# Patient Record
Sex: FEMALE | Race: WHITE | NOT HISPANIC OR LATINO | Employment: FULL TIME | URBAN - METROPOLITAN AREA
[De-identification: names, ages, dates, MRNs, and addresses within clinical notes are randomized per-mention and may not be internally consistent; named-entity substitution may affect disease eponyms.]

---

## 2017-01-28 ENCOUNTER — OFFICE VISIT (OUTPATIENT)
Dept: URGENT CARE | Facility: MEDICAL CENTER | Age: 32
End: 2017-01-28
Payer: COMMERCIAL

## 2017-01-28 DIAGNOSIS — J02.9 ACUTE PHARYNGITIS: ICD-10-CM

## 2017-01-28 PROCEDURE — 87070 CULTURE OTHR SPECIMN AEROBIC: CPT

## 2017-01-28 PROCEDURE — 99202 OFFICE O/P NEW SF 15 MIN: CPT

## 2017-01-28 PROCEDURE — 87430 STREP A AG IA: CPT

## 2017-01-30 ENCOUNTER — HOSPITAL ENCOUNTER (EMERGENCY)
Facility: HOSPITAL | Age: 32
Discharge: HOME | End: 2017-01-30
Attending: EMERGENCY MEDICINE | Admitting: EMERGENCY MEDICINE
Payer: COMMERCIAL

## 2017-01-30 VITALS
OXYGEN SATURATION: 96 % | HEIGHT: 64 IN | HEART RATE: 76 BPM | WEIGHT: 216 LBS | SYSTOLIC BLOOD PRESSURE: 105 MMHG | RESPIRATION RATE: 18 BRPM | BODY MASS INDEX: 36.88 KG/M2 | TEMPERATURE: 97.4 F | DIASTOLIC BLOOD PRESSURE: 63 MMHG

## 2017-01-30 DIAGNOSIS — R51.9 HEADACHE: Primary | ICD-10-CM

## 2017-01-30 LAB
BACTERIA UR QL AUTO: ABNORMAL /HPF
BILIRUB UR QL STRIP: NEGATIVE
CLARITY UR: ABNORMAL
COLOR UR: YELLOW
GLUCOSE UR STRIP-MCNC: NEGATIVE MG/DL
HGB UR QL STRIP.AUTO: ABNORMAL
KETONES UR STRIP-MCNC: NEGATIVE MG/DL
LEUKOCYTE ESTERASE UR QL STRIP: ABNORMAL
NITRITE UR QL STRIP: NEGATIVE
NON-SQ EPI CELLS URNS QL MICRO: ABNORMAL /HPF
PH UR STRIP.AUTO: 6.5 [PH] (ref 4.5–8)
PROT UR STRIP-MCNC: NEGATIVE MG/DL
RBC #/AREA URNS AUTO: ABNORMAL /HPF
SP GR UR STRIP.AUTO: 1.01 (ref 1–1.03)
UROBILINOGEN UR QL STRIP.AUTO: 0.2 E.U./DL
WBC #/AREA URNS AUTO: ABNORMAL /HPF

## 2017-01-30 PROCEDURE — 87086 URINE CULTURE/COLONY COUNT: CPT

## 2017-01-30 PROCEDURE — 96375 TX/PRO/DX INJ NEW DRUG ADDON: CPT

## 2017-01-30 PROCEDURE — 81001 URINALYSIS AUTO W/SCOPE: CPT

## 2017-01-30 PROCEDURE — 96365 THER/PROPH/DIAG IV INF INIT: CPT

## 2017-01-30 PROCEDURE — 96361 HYDRATE IV INFUSION ADD-ON: CPT

## 2017-01-30 PROCEDURE — 99283 EMERGENCY DEPT VISIT LOW MDM: CPT

## 2017-01-30 RX ORDER — MORPHINE SULFATE 10 MG/ML
6 INJECTION, SOLUTION INTRAMUSCULAR; INTRAVENOUS ONCE
Status: COMPLETED | OUTPATIENT
Start: 2017-01-30 | End: 2017-01-30

## 2017-01-30 RX ORDER — KETOROLAC TROMETHAMINE 30 MG/ML
15 INJECTION, SOLUTION INTRAMUSCULAR; INTRAVENOUS ONCE
Status: COMPLETED | OUTPATIENT
Start: 2017-01-30 | End: 2017-01-30

## 2017-01-30 RX ORDER — METOCLOPRAMIDE HYDROCHLORIDE 5 MG/ML
10 INJECTION INTRAMUSCULAR; INTRAVENOUS ONCE
Status: COMPLETED | OUTPATIENT
Start: 2017-01-30 | End: 2017-01-30

## 2017-01-30 RX ORDER — DIPHENHYDRAMINE HYDROCHLORIDE 50 MG/ML
25 INJECTION INTRAMUSCULAR; INTRAVENOUS ONCE
Status: COMPLETED | OUTPATIENT
Start: 2017-01-30 | End: 2017-01-30

## 2017-01-30 RX ORDER — MAGNESIUM SULFATE HEPTAHYDRATE 40 MG/ML
2 INJECTION, SOLUTION INTRAVENOUS ONCE
Status: COMPLETED | OUTPATIENT
Start: 2017-01-30 | End: 2017-01-30

## 2017-01-30 RX ORDER — OLANZAPINE 10 MG/1
10 TABLET, ORALLY DISINTEGRATING ORAL ONCE
Status: COMPLETED | OUTPATIENT
Start: 2017-01-30 | End: 2017-01-30

## 2017-01-30 RX ORDER — DIVALPROEX SODIUM 500 MG/1
500 TABLET, EXTENDED RELEASE ORAL DAILY
Status: DISCONTINUED | OUTPATIENT
Start: 2017-01-30 | End: 2017-01-30 | Stop reason: HOSPADM

## 2017-01-30 RX ORDER — OLANZAPINE 10 MG/1
10 TABLET, ORALLY DISINTEGRATING ORAL
Status: DISCONTINUED | OUTPATIENT
Start: 2017-01-30 | End: 2017-01-30

## 2017-01-30 RX ADMIN — OLANZAPINE 10 MG: 10 TABLET, ORALLY DISINTEGRATING ORAL at 11:52

## 2017-01-30 RX ADMIN — DIVALPROEX SODIUM 500 MG: 500 TABLET, EXTENDED RELEASE ORAL at 10:01

## 2017-01-30 RX ADMIN — DIPHENHYDRAMINE HYDROCHLORIDE 25 MG: 50 INJECTION, SOLUTION INTRAMUSCULAR; INTRAVENOUS at 08:41

## 2017-01-30 RX ADMIN — DEXAMETHASONE SODIUM PHOSPHATE 10 MG: 10 INJECTION, SOLUTION INTRAMUSCULAR; INTRAVENOUS at 11:53

## 2017-01-30 RX ADMIN — MAGNESIUM SULFATE HEPTAHYDRATE 2 G: 40 INJECTION, SOLUTION INTRAVENOUS at 10:00

## 2017-01-30 RX ADMIN — SODIUM CHLORIDE 1000 ML: 0.9 INJECTION, SOLUTION INTRAVENOUS at 08:39

## 2017-01-30 RX ADMIN — METOCLOPRAMIDE 10 MG: 5 INJECTION, SOLUTION INTRAMUSCULAR; INTRAVENOUS at 08:39

## 2017-01-30 RX ADMIN — MORPHINE SULFATE 6 MG: 10 INJECTION, SOLUTION INTRAMUSCULAR; INTRAVENOUS at 13:27

## 2017-01-30 RX ADMIN — KETOROLAC TROMETHAMINE 15 MG: 30 INJECTION, SOLUTION INTRAMUSCULAR at 08:41

## 2017-01-31 LAB
BACTERIA THROAT CULT: NORMAL
BACTERIA UR CULT: NORMAL

## 2017-03-01 ENCOUNTER — ALLSCRIPTS OFFICE VISIT (OUTPATIENT)
Dept: OTHER | Facility: OTHER | Age: 32
End: 2017-03-01

## 2017-03-01 ENCOUNTER — TRANSCRIBE ORDERS (OUTPATIENT)
Dept: ADMINISTRATIVE | Facility: HOSPITAL | Age: 32
End: 2017-03-01

## 2017-03-01 DIAGNOSIS — G43.829 MENSTRUAL MIGRAINE WITHOUT STATUS MIGRAINOSUS, NOT INTRACTABLE: ICD-10-CM

## 2017-03-01 DIAGNOSIS — G44.85 STABBING HEADACHE: ICD-10-CM

## 2017-03-01 DIAGNOSIS — R52 PAIN: ICD-10-CM

## 2017-03-01 DIAGNOSIS — G50.1 ATYPICAL FACE PAIN: Primary | ICD-10-CM

## 2017-03-01 DIAGNOSIS — G44.85 PRIMARY STABBING HEADACHE: ICD-10-CM

## 2017-03-01 DIAGNOSIS — G50.1 ATYPICAL FACIAL PAIN: ICD-10-CM

## 2017-03-01 DIAGNOSIS — E55.9 VITAMIN D DEFICIENCY: ICD-10-CM

## 2017-03-11 ENCOUNTER — LAB CONVERSION - ENCOUNTER (OUTPATIENT)
Dept: OTHER | Facility: OTHER | Age: 32
End: 2017-03-11

## 2017-03-11 LAB — 25(OH)D3 SERPL-MCNC: 16 NG/ML (ref 30–100)

## 2017-03-13 ENCOUNTER — LAB CONVERSION - ENCOUNTER (OUTPATIENT)
Dept: OTHER | Facility: OTHER | Age: 32
End: 2017-03-13

## 2017-03-13 LAB
25(OH)D3 SERPL-MCNC: 16 NG/ML (ref 30–100)
A/G RATIO (HISTORICAL): 1.3 (CALC) (ref 1–2.5)
ALBUMIN SERPL BCP-MCNC: 3.9 G/DL (ref 3.6–5.1)
ALP SERPL-CCNC: 59 U/L (ref 33–115)
ALT SERPL W P-5'-P-CCNC: 8 U/L (ref 6–29)
AST SERPL W P-5'-P-CCNC: 10 U/L (ref 10–30)
BILIRUB SERPL-MCNC: 0.2 MG/DL (ref 0.2–1.2)
BUN SERPL-MCNC: 10 MG/DL (ref 7–25)
BUN/CREA RATIO (HISTORICAL): NORMAL (CALC) (ref 6–22)
CALCIUM SERPL-MCNC: 8.8 MG/DL (ref 8.6–10.2)
CHLORIDE SERPL-SCNC: 106 MMOL/L (ref 98–110)
CO2 SERPL-SCNC: 27 MMOL/L (ref 20–31)
CREAT SERPL-MCNC: 0.59 MG/DL (ref 0.5–1.1)
EGFR AFRICAN AMERICAN (HISTORICAL): 143 ML/MIN/1.73M2
EGFR-AMERICAN CALC (HISTORICAL): 123 ML/MIN/1.73M2
GAMMA GLOBULIN (HISTORICAL): 3.1 G/DL (CALC) (ref 1.9–3.7)
GLUCOSE (HISTORICAL): 95 MG/DL (ref 65–99)
HOMOCYST(E)INE, P/S (HISTORICAL): 5.3 UMOL/L
POTASSIUM SERPL-SCNC: 3.7 MMOL/L (ref 3.5–5.3)
SODIUM SERPL-SCNC: 140 MMOL/L (ref 135–146)
TOTAL PROTEIN (HISTORICAL): 7 G/DL (ref 6.1–8.1)
VIT B12 SERPL-MCNC: 251 PG/ML (ref 200–1100)

## 2017-03-16 ENCOUNTER — HOSPITAL ENCOUNTER (OUTPATIENT)
Dept: MRI IMAGING | Facility: HOSPITAL | Age: 32
Discharge: HOME | End: 2017-03-16
Attending: PSYCHIATRY & NEUROLOGY
Payer: COMMERCIAL

## 2017-03-16 DIAGNOSIS — G44.85 STABBING HEADACHE: ICD-10-CM

## 2017-03-16 PROCEDURE — A9585 GADOBUTROL INJECTION: HCPCS | Performed by: PSYCHIATRY & NEUROLOGY

## 2017-03-16 PROCEDURE — 70553 MRI BRAIN STEM W/O & W/DYE: CPT

## 2017-03-16 RX ADMIN — GADOBUTROL 10 ML: 604.72 INJECTION INTRAVENOUS at 18:29

## 2017-03-31 ENCOUNTER — ALLSCRIPTS OFFICE VISIT (OUTPATIENT)
Dept: OTHER | Facility: OTHER | Age: 32
End: 2017-03-31

## 2017-04-28 ENCOUNTER — ALLSCRIPTS OFFICE VISIT (OUTPATIENT)
Dept: OTHER | Facility: OTHER | Age: 32
End: 2017-04-28

## 2017-04-28 DIAGNOSIS — E66.9 OBESITY: ICD-10-CM

## 2017-04-28 DIAGNOSIS — R59.1 GENERALIZED ENLARGED LYMPH NODES: ICD-10-CM

## 2017-05-09 ENCOUNTER — LAB CONVERSION - ENCOUNTER (OUTPATIENT)
Dept: OTHER | Facility: OTHER | Age: 32
End: 2017-05-09

## 2017-05-09 LAB
A/G RATIO (HISTORICAL): 1.2 (CALC) (ref 1–2.5)
ALBUMIN SERPL BCP-MCNC: 3.7 G/DL (ref 3.6–5.1)
ALP SERPL-CCNC: 62 U/L (ref 33–115)
ALT SERPL W P-5'-P-CCNC: 10 U/L (ref 6–29)
ANTI-NUCLEAR ANTIBODY (ANA) (HISTORICAL): NEGATIVE
AST SERPL W P-5'-P-CCNC: 13 U/L (ref 10–30)
BASOPHILS # BLD AUTO: 0.3 %
BASOPHILS # BLD AUTO: 28 CELLS/UL (ref 0–200)
BILIRUB SERPL-MCNC: 0.5 MG/DL (ref 0.2–1.2)
BUN SERPL-MCNC: 9 MG/DL (ref 7–25)
BUN/CREA RATIO (HISTORICAL): NORMAL (CALC) (ref 6–22)
CALCIUM SERPL-MCNC: 8.8 MG/DL (ref 8.6–10.2)
CHLORIDE SERPL-SCNC: 104 MMOL/L (ref 98–110)
CHOLEST SERPL-MCNC: 167 MG/DL (ref 125–200)
CHOLEST/HDLC SERPL: 3.2 (CALC)
CO2 SERPL-SCNC: 25 MMOL/L (ref 20–31)
CREAT SERPL-MCNC: 0.54 MG/DL (ref 0.5–1.1)
DEPRECATED RDW RBC AUTO: 13.9 % (ref 11–15)
EGFR AFRICAN AMERICAN (HISTORICAL): 147 ML/MIN/1.73M2
EGFR-AMERICAN CALC (HISTORICAL): 127 ML/MIN/1.73M2
EOSINOPHIL # BLD AUTO: 0.5 %
EOSINOPHIL # BLD AUTO: 47 CELLS/UL (ref 15–500)
ERYTHROCYTE SEDIMENTATION RATE (HISTORICAL): 25 MM/H
GAMMA GLOBULIN (HISTORICAL): 3.2 G/DL (CALC) (ref 1.9–3.7)
GLUCOSE (HISTORICAL): 73 MG/DL (ref 65–99)
HCT VFR BLD AUTO: 37.1 % (ref 35–45)
HDLC SERPL-MCNC: 53 MG/DL
HGB BLD-MCNC: 11.8 G/DL (ref 11.7–15.5)
LDL CHOLESTEROL (HISTORICAL): 87 MG/DL (CALC)
LYMPHOCYTES # BLD AUTO: 1860 CELLS/UL (ref 850–3900)
LYMPHOCYTES # BLD AUTO: 20 %
MCH RBC QN AUTO: 26 PG (ref 27–33)
MCHC RBC AUTO-ENTMCNC: 31.7 G/DL (ref 32–36)
MCV RBC AUTO: 82 FL (ref 80–100)
MONOCYTES # BLD AUTO: 605 CELLS/UL (ref 200–950)
MONOCYTES (HISTORICAL): 6.5 %
NEUTROPHILS # BLD AUTO: 6761 CELLS/UL (ref 1500–7800)
NEUTROPHILS # BLD AUTO: 72.7 %
NON-HDL-CHOL (CHOL-HDL) (HISTORICAL): 114 MG/DL (CALC)
PLATELET # BLD AUTO: 245 THOUSAND/UL (ref 140–400)
PMV BLD AUTO: 9.5 FL (ref 7.5–12.5)
POTASSIUM SERPL-SCNC: 4 MMOL/L (ref 3.5–5.3)
RBC # BLD AUTO: 4.53 MILLION/UL (ref 3.8–5.1)
RHEUMATOID FACTOR (HISTORICAL): 9 IU/ML
SODIUM SERPL-SCNC: 139 MMOL/L (ref 135–146)
TOTAL PROTEIN (HISTORICAL): 6.9 G/DL (ref 6.1–8.1)
TRIGL SERPL-MCNC: 136 MG/DL
WBC # BLD AUTO: 9.3 THOUSAND/UL (ref 3.8–10.8)

## 2017-07-07 ENCOUNTER — TRANSCRIBE ORDERS (OUTPATIENT)
Dept: ADMINISTRATIVE | Facility: HOSPITAL | Age: 32
End: 2017-07-07

## 2017-07-07 ENCOUNTER — ALLSCRIPTS OFFICE VISIT (OUTPATIENT)
Dept: OTHER | Facility: OTHER | Age: 32
End: 2017-07-07

## 2017-07-07 DIAGNOSIS — M54.2 CERVICALGIA: Primary | ICD-10-CM

## 2017-07-07 DIAGNOSIS — R29.2 ABNORMAL REFLEX: ICD-10-CM

## 2017-07-07 DIAGNOSIS — M54.2 CERVICALGIA: ICD-10-CM

## 2017-07-07 DIAGNOSIS — M54.41 LOW BACK PAIN WITH RIGHT-SIDED SCIATICA: ICD-10-CM

## 2017-07-07 DIAGNOSIS — M54.41 ACUTE BACK PAIN WITH SCIATICA, RIGHT: Primary | ICD-10-CM

## 2017-07-28 ENCOUNTER — HOSPITAL ENCOUNTER (OUTPATIENT)
Dept: MRI IMAGING | Facility: HOSPITAL | Age: 32
Discharge: HOME | End: 2017-07-28
Payer: COMMERCIAL

## 2017-07-28 DIAGNOSIS — R29.2 ABNORMAL REFLEX: ICD-10-CM

## 2017-07-28 DIAGNOSIS — M54.41 LOW BACK PAIN WITH RIGHT-SIDED SCIATICA: ICD-10-CM

## 2017-07-28 DIAGNOSIS — M54.2 CERVICALGIA: ICD-10-CM

## 2017-07-28 PROCEDURE — 72148 MRI LUMBAR SPINE W/O DYE: CPT

## 2017-07-28 PROCEDURE — 72141 MRI NECK SPINE W/O DYE: CPT

## 2017-08-10 ENCOUNTER — ALLSCRIPTS OFFICE VISIT (OUTPATIENT)
Dept: OTHER | Facility: OTHER | Age: 32
End: 2017-08-10

## 2017-08-10 LAB
BILIRUB UR QL STRIP: NORMAL
CLARITY UR: NORMAL
COLOR UR: YELLOW
GLUCOSE (HISTORICAL): NORMAL
HGB UR QL STRIP.AUTO: NORMAL
KETONES UR STRIP-MCNC: NORMAL MG/DL
LEUKOCYTE ESTERASE UR QL STRIP: NORMAL
NITRITE UR QL STRIP: NORMAL
PH UR STRIP.AUTO: 7 [PH]
PROT UR STRIP-MCNC: 30 MG/DL
SP GR UR STRIP.AUTO: 1
UROBILINOGEN UR QL STRIP.AUTO: NORMAL

## 2017-08-30 ENCOUNTER — HOSPITAL ENCOUNTER (OUTPATIENT)
Dept: NEUROLOGY | Facility: CLINIC | Age: 32
Discharge: HOME | End: 2017-08-30
Payer: COMMERCIAL

## 2017-08-30 DIAGNOSIS — R29.2 ABNORMAL REFLEX: ICD-10-CM

## 2017-08-30 DIAGNOSIS — M54.2 CERVICALGIA: ICD-10-CM

## 2017-08-30 DIAGNOSIS — R20.2 PARESTHESIA: ICD-10-CM

## 2017-08-30 PROCEDURE — 95913 NRV CNDJ TEST 13/> STUDIES: CPT

## 2017-08-30 PROCEDURE — 95886 MUSC TEST DONE W/N TEST COMP: CPT

## 2017-10-25 ENCOUNTER — ALLSCRIPTS OFFICE VISIT (OUTPATIENT)
Dept: OTHER | Facility: OTHER | Age: 32
End: 2017-10-25

## 2017-10-25 DIAGNOSIS — M54.41 LOW BACK PAIN WITH RIGHT-SIDED SCIATICA: ICD-10-CM

## 2017-10-25 DIAGNOSIS — M79.10 MYALGIA: ICD-10-CM

## 2017-10-25 DIAGNOSIS — R51.9 HEADACHE: ICD-10-CM

## 2017-10-25 DIAGNOSIS — R29.2 ABNORMAL REFLEX: ICD-10-CM

## 2017-11-02 NOTE — PROGRESS NOTES
Assessment  1  Cervicogenic headache (784 0) (R51)  2  Chronic midline low back pain with right-sided sciatica (724 2,724 3,338 29)   (M54 41,G89 29)  3  Low vitamin D level (268 9) (E55 9)  4  Menstrual migraine (346 40) (G43 829)  5  Primary stabbing headache (339 85) (G44 85)  6  Reflex asymmetry (796 1) (R29 2)  7  Myofascial pain (729 1) (M79 1)    Plan  Cervicogenic headache    · 1 - Jerome Gupta MD, Lorena Durham  (Plastic And Reconstructive Surgery) Co-Management  *to  discuss breast reconstructive surgery to address headaches  Status: Active  Requested  for: 65VWO0678  Ordered; For: Cervicogenic headache;  Ordered By: Riccardo Ventura  Performed:   Due:   37NOB0470; Last Updated By: Krzysztof Witt; 10/25/2017 10:15:46 AM  () Care Summary provided  : Yes  Cervicogenic headache, Chronic midline low back pain with right-sided sciatica, Diffuse  pain, Myofascial pain    · 3 - Marky Reyes DO (Rheumatology) Co-Management  *  Status: Active   Requested for: 13LPN2380  Ordered; For: Cervicogenic headache, Chronic midline low back pain with right-sided   sciatica, Diffuse pain, Myofascial pain;  Ordered By: Riccardo Ventura    Performed:   Due: 91NJW8905; Last Updated By: Krzysztof Witt; 10/25/2017 10:15:46   AM  YOU are Referring to a non- Preferred Provider : Provider not listed in [employer]  () Care Summary provided  : Yes  Cervicogenic headache, Chronic midline low back pain with right-sided sciatica,  Menstrual migraine, Myofascial pain    · Start: DULoxetine HCl - 20 MG Oral Capsule Delayed Release Particles; Take 1 cap qHS  x 2 weeks, then 2 caps qHS  Rx By: Riccardo Ventura; Dispense: 0 Days ; #:60 Capsule Delayed Release Particles;    Refill: 2;For: Cervicogenic headache, Chronic midline low back pain with right-sided   sciatica, Menstrual migraine, Myofascial pain; RUBÉN = N; Verified Transmission to [address] [place] [address] #[id number]; Last Updated By: System, [employer]; 10/25/2017 9:58:08 AM  Cervicogenic headache, Chronic midline low back pain with right-sided sciatica,  Myofascial pain, Reflex asymmetry    · *1 - [facility] Co-Management  * pt would like to discuss TENS unit and back  brace if possible  Status: Active  Requested for: 51QGI7102  Ordered; For: Cervicogenic headache, Chronic midline low back pain with right-sided   sciatica, Myofascial pain, Reflex asymmetry;  Ordered By: Minnie Eng    Performed:   Due: 33OFK2065; Last Updated By: Nilesh Nolasco; 10/25/2017 10:15:46   AM  () Care Summary provided  : Yes   · *1 - [facility] Co-Management  *  Status: Active  Requested for:  89GMC8353  Ordered; For: Cervicogenic headache, Chronic midline low back pain with right-sided   sciatica, Myofascial pain, Reflex asymmetry;  Ordered By: Minnie Eng    Performed:   Due: 79DFC8442; Last Updated By: Nilesh Nolasco; 10/25/2017 10:15:46   AM  () Care Summary provided  : Yes   · Follow-up visit in 6 months Evaluation and Treatment  Follow-up  Status: Complete   Done: 51VPN7982  Ordered; For: Cervicogenic headache, Chronic midline low back pain with right-sided   sciatica, Myofascial pain, Reflex asymmetry;  Ordered By: Minnie Eng    Performed:   Due: 98LLE1406; Last Updated By: Nilesh Nolasco; 10/25/2017 10:15:46   AM  Cervicogenic headache, Menstrual migraine    · Start: Ketorolac Tromethamine 10 MG Oral Tablet; take 1-2 tablets as needed for  headache, no more than 2 tablets in 24hr and no more than 3 tablets in  1 week  Rx By: Minnie Dawson; Dispense: 30 Days ; #:10 Tablet;  Refill: 0;For: Cervicogenic   headache, Menstrual migraine; RUBÉN = N; Sent To: [facility] #[id number]    Discussion/Summary  Discussion Summary:   Chronic migrainemigraineheadache, myofascial painprefer PM evaluation to discuss TPIs, CHRISTI considering diffuse cervical disc bulge, in addition lumbar disc bulges, thankfully EMG normal of UEs, no myelopathic signs but reflexes are asymmetric with left side 1+we also discussed the possibility of breast reconstruction surgery to address neck and back pain, for which I provided a PS referral, Dr Nigel Hawley referral if pain management unable to address concerns, considering she has fairly diffuse muscle pain, back and joint pains; ?fibromyalgia, ? RAdiscussed weight management and she wants to contact them personally instead of getting a referral today  headache prevention:Wean protriptyline to 5 mg qd x 1 week, then stop; at the same time start Cymbalta  At the onset of a migraine- try Ketorolac  For a lower grade headache, can take Excedrin migraine  ordered PT for neck and lumbar pain; discussed massage, TENS unit use and lumbar brace  patient was encouraged to call the office with any questions or concerns  The patient was encouraged to follow up with her physician Dr Marisa Jack as directed by Vivienne Viveros  Patient's Capacity to Self-Care: Patient is able to Self-Care  Medication SE Review and Pt Understands Tx: Possible side effects of new medications were reviewed with the patient/guardian today  The treatment plan was reviewed with the patient/guardian  The patient/guardian understands and agrees with the treatment plan   Patient Guardian understands agrees: The treatment plan was reviewed with the patient/guardian  The patient/guardian understands and agrees with the treatment plan      Chief Complaint  Chief Complaint Free Text Note Form: Patient presents for a follow-up for headaches  History of Present Illness  HPI: We had the pleasure of evaluating Thai Romero in neurological follow up today  As you know she is a 33year old left handed female  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[employer]; she works on a computer  She is here today for evaluation of her headaches  last seen the patient reports that her stabbing headaches in the left parietal region are resolved   She thinks the medication protriptyline has helped these, however it causes increased HR  The patient discontinued verapamil, Maxalt and Imitrex in the past because she felt that they made her nauseous, caused abdominal pain  She continues to get lower grade headaches and migraines every now and then, particularly associated with menstruation, total of about 1-2 migraines monthly and 3-4 lower grade headaches without a clear trigger per month   continues to have some muscle spasm particularly in the low back and leg R>L, cervical muscles L>R and numbness and pain in the left arm  and numbness:New since last time: The patient is experiencing twitching in her left first toe, which she only notices when she is laying down or resting  It does not hurt and there is no cramping but there is tingling and sometimes the toe moves on its own/ twitches  In her leg and her eye lids  She states her muscle tend to twitch all over and can occur anytime of the day  Bending down will make her muscle hurt  She states if someone touches her she feel that seems to hurt as well  She is having balance problem and is falling but is stumbling  She also feels dizzy and it tends to occur anytime of the day  this continues to occur in her leg  parietal headaches: She states January of 2017 she started to develop new headaches on the left parietal region  It tends to be very severe when it does occur  At time she puts her head down she feels a lot of pressure in her head  It tends to radiate down to her left jaw at time  Left parietal pain has been going on for the last year    often do the headaches occur â type headaches: 5 per monthpain: that happened just twice with their left parietal paintime of the day do the headaches start âheadaches: varies but always occurs with menstruationParietal pain: any time of the daylong do the headaches last â Migraine â it tends to last 6 to 10 hoursparietal pain: for few hours to a dayyou ever headache free - yesare they located â migraine headaches: temporal, frontalparietal pain as describeis the intensity of pain â Migraine headaches: Average 2/10; they can get up to 5/10 and left parietal pain 8/10warning prior to headache and how long do they last - noneyour usual headache â Migraine headache: Throbbing, Pounding, dull, naggingparietal pain is: sharp pain symptoms: Decrease of appetite, nausea, vomiting with migrainePhotophobia with migraine but not last months and phonophobia with both headachesProblem with concentrationflushing /pale faceis unable to workprefer to be alone and in a dark roomlightheaded/ dizzytrigger: No trigger for parietal stabbing pains; migraines are caused by menstruation  medications do you take or have you taken for your headaches? Topamax, venlafaxine (mood change), verapamil, ProtriptylineImitrex, MaxaltTreatments used in the past for headaches: chiropractor  of systems, Past medical history, Surgical history, Family history, Social history and Medication history were all reviewed today  note the patient had a snowboarding accident and broke her L5 vertebrae and she was about [age]years old (when I was in eighth grade)  She had surgery with wires wrapped around the L5 vertebrae  She continues to have back pain almost every day, and numbness and tingling in her lower extremities right greater than left  also had shoulder surgery to tighten ligaments in the left shoulder   - broke back snowboarding       Review of Systems  Neurological ROS:   Constitutional: no fever, no chills, no recent weight gain, no recent weight loss, no complaints of feeling tired, no changes in appetite  HEENT: blurred vision  Cardiovascular:  no chest pain or pressure, no palpitations present, the heart rate was not rapid or irregular, no swelling in the arms or legs, no poor circulation  Respiratory:  no unusual or persistant cough, no shortness of breath with or without exertion  Gastrointestinal: nausea     Genitourinary:  no incontinence, no feelings of urinary urgency, no increase in frequency, no urinary hesitancy, no dysuria, no hematuria  Musculoskeletal: arthralgias,-- myalgias-- and-- head/neck/back pain  Integumentary  no masses, no rash, no skin lesions, no livedo reticularis  Psychiatric: anxiety  Endocrine  no unusual weight loss or gain, no excessive urination, no excessive thirst, no hair loss or gain, no hot or cold intolerance, no menstrual period change or irregularity, no loss of sexual ability or drive, no erection difficulty, no nipple discharge  Hematologic/Lymphatic:  no unusual bleeding, no tendency for easy bruising, no clotting skin or lumps  Neurological General: headache  Neurological Mental Status:  no confusion, no mood swings, no alteration or loss of consciousness, no difficulty expressing/understanding speech, no memory problems  Neurological Cranial Nerves: vertigo or dizziness  Neurological Motor findings include: twitching  Neurological Coordination: balance difficulties  Neurological Sensory: numbness-- and-- tingling  Neurological Gait:  no difficulty walking, not falling to one side, no sensation of being pushed, has not had falls  Active Problems  1  Abdominal pain (789 00) (R10 9)  2  Anxiety (300 00) (F41 9)  3  Asthma (493 90) (J45 909)  4  Atypical face pain (350 2) (G50 1)  5  Benign paroxysmal positional vertigo (386 11) (H81 10)  6  Cervicogenic headache (784 0) (R51)  7  Chronic constipation (564 00) (K59 09)  8  Chronic midline low back pain with right-sided sciatica (724 2,724 3,338 29)   (M54 41,G89 29)  9  Diffuse pain (780 96) (R52)  10  Fatigue (780 79) (R53 83)  11  IBS (irritable bowel syndrome) (564 1) (K58 9)  12  Low vitamin D level (268 9) (E55 9)  13  Menstrual migraine (346 40) (G43 829)  14  Neck pain (723 1) (M54 2)  15  NUD (nonulcer dyspepsia) (536 8) (K30)  16  Obesity (278 00) (E66 9)  17  Pain in wrist, unspecified laterality (719 43) (M25 539)  18   Primary stabbing headache (339 85) (G44 85)  19  Reflex asymmetry (796 1) (R29 2)  20  RUQ pain (789 01) (R10 11)  21  Sore throat (462) (J02 9)  22  Vertigo (780 4) (R42)  23  Viral syndrome (079 99) (B34 9)  24  Vitamin B12 deficiency (266 2) (E53 8)    Past Medical History  1  History of migraine (V12 49) (Z86 69)  2  History of Neck muscle spasm (728 85) (J47 051)    Surgical History  1  History of Back Surgery  2  History of Esophagogastrostomy  3  History of Knee Surgery  4  History of Shoulder Surgery    Family History  Mother   1  Family history of Dyslipidemia  Father   2  Family history of Dyslipidemia    Social History   · Never a smoker   · Social alcohol use (Z78 9)    Current Meds  1  Ciprofloxacin HCl - 500 MG Oral Tablet; TAKE 1 TABLET EVERY 12 HOURS DAILY; Therapy: 03BJJ3948 to (Evaluate:21Vdo0762)  Requested for: 10Aug2017; Last   Rx:10Aug2017 Ordered  2  Lactulose 10 GM/15ML Oral Solution; TAKE ONE TABLESPOONFUL BY MOUTH EVERY   DAY; Therapy: 28Apr2017 to ([id number])  Requested for: 28Apr2017; Last   Rx:28Apr2017; Status: ACTIVE - Transmit to Pharmacy - Awaiting Verification Ordered  3  Meclizine HCl - 12 5 MG Oral Tablet; as needed; Therapy: (ZVDKFBHY:70NZU8703) to Recorded  4  Multi-Day TABS; TAKE 1 TABLET DAILY; Therapy: (Recorded:22Xnn3810) to Recorded  5  Omeprazole 20 MG Oral Capsule Delayed Release; take 1 capsule by mouth once daily; Therapy: 92UMJ5621 to (Evaluate:43Sle0594)  Requested for: 10Aug2017; Last   Rx:10Aug2017 Ordered  6  Protriptyline HCl - 5 MG Oral Tablet; One in am for 10 days then two in am after that; Therapy: 42IKR8600 to (Last Rx:31Mar2017)  Requested for: 00VWQ2757; Status: ACTIVE   - Transmit to [facility] Verification Ordered  7  Vitamin D (Ergocalciferol) 94689 UNIT Oral Capsule; TAKE 1 CAPSULE WEEKLY;    Therapy: 28Apr2017 to (Last Rx:28Apr2017)  Requested for: 28Apr2017; Status: [address] to [facility] Ordered    Allergies  1  No Known Drug Allergies    Vitals  Signs   Recorded: 41IPW4956 09:05AM   Heart Rate: 70  Respiration: 14  Systolic: 662  Diastolic: 80  Height: 5 ft 4 in  Weight: 202 lb   BMI Calculated: 34 67  BSA Calculated: 1 96  O2 Saturation: 99    Physical Exam    Constitutional   General appearance: No acute distress, well appearing and well nourished  -- +TTP in the cervical spine at approximately C5- C7 vertebrae  Musculoskeletal   Gait and station: Normal gait, stance and balance  Muscle strength: Normal strength throughout  Muscle tone: No atrophy, abnormal movements, flaccidity, cogwheeling or spasticity  Neurologic   2nd cranial nerve: Normal     3rd, 4th, and 6th cranial nerves: Normal     5th cranial nerve: Normal     7th cranial nerve: Normal     8th cranial nerve: Normal     9th cranial nerve: Normal     11th cranial nerve: Normal     12th cranial nerve: Normal     Sensation: Abnormal  -- (temp sensation slightly decreased in the left forearm and dorsal aspect of the hand compared to the right) Sensory exam: pain and temperature sensation was not intact, but-- intact to light touch  Reflexes: Abnormal   Deep tendon reflexes: 1+ left biceps-- and-- 1+ left brachioradialis2+ right biceps,-- 2+ right brachioradialis,-- 2+ right patella,-- 2+ left patella,-- 2+ right ankle jerk-- and-- 2+ left ankle jerk  Coordination: Normal        Signatures   Electronically signed by :  Bridget Elizabeth, [facility]; Oct 25 2017 11:12AM EST                       (Author)    Electronically signed by : Daquan Perdomo MD; Nov 1 2017  2:44PM EST                       (Author)    Electronically signed by : Daquan Perdomo MD; Nov 1 2017  2:44PM EST                       (Author)

## 2017-12-23 ENCOUNTER — OFFICE VISIT (OUTPATIENT)
Dept: URGENT CARE | Facility: MEDICAL CENTER | Age: 32
End: 2017-12-23
Payer: COMMERCIAL

## 2017-12-23 PROCEDURE — 99213 OFFICE O/P EST LOW 20 MIN: CPT

## 2017-12-24 NOTE — PROGRESS NOTES
Assessment   1  Acute bronchitis (466 0) (J20 9)    Plan   Abdominal pain    · Stop: Omeprazole 20 MG Oral Capsule Delayed Release  Acute bronchitis    · Start: Benzonatate 100 MG Oral Capsule; TAKE 1 CAPSULE 3 TIMES DAILY AS NEEDED   · Start: DrRx Zithromax Z-Onel 250 MG #6 pill pack; 2 tablets first dose, then 1 tablet daily    for a total of 5 days  Unlinked    · Stop: Meclizine HCl - 12 5 MG Oral Tablet    Discussion/Summary   Discussion Summary:    I prescribed Zithromax Z-Onel, Tessalon Perles 100 mg every 8 hours for cough  Encourage patient increase fluid intake  Follow up with primary care provider symptoms persist or worsens  Medication Side Effects Reviewed: Possible side effects of new medications were reviewed with the patient/guardian today  Understands and agrees with treatment plan: The treatment plan was reviewed with the patient/guardian  The patient/guardian understands and agrees with the treatment plan    Counseling Documentation With Imm: The patient was counseled regarding  Chief Complaint   1  Cold Symptoms  Chief Complaint Free Text Note Form: PT with complaints of head and chest congestion, cough for 1 week   Past 2 days, headache, chills and low grade fever  Temp today 100 7      History of Present Illness   HPI: Patient here with nasal and chest congestion for the past week  Describes bifrontal headache which he attributes to cough  Cough is predominant nonproductive  She has also experienced low-grade temperature between  for which she has taken ibuprofen  Currently taking some over-the-counter cold medication with no significant improvement  Denies shortness of breath  Hospital Based Practices Required Assessment:      Pain Assessment      the patient states they have pain  The pain is located in the headache   The patient describes the pain as aching  (on a scale of 0 to 10, the patient rates the pain at 6 )       Review of Systems   Focused-Female: Constitutional: No fever, no chills, feels well, no tiredness, no recent weight gain or loss  ENT: nasal discharge  Cardiovascular: no complaints of slow or fast heart rate, no chest pain, no palpitations, no leg claudication or lower extremity edema  Respiratory: cough  Active Problems   1  Abdominal pain (789 00) (R10 9)  2  Anxiety (300 00) (F41 9)  3  Asthma (493 90) (J45 909)  4  Atypical face pain (350 2) (G50 1)  5  Benign paroxysmal positional vertigo (386 11) (H81 10)  6  Cervicogenic headache (784 0) (R51)  7  Chronic constipation (564 00) (K59 09)  8  Chronic midline low back pain with right-sided sciatica (724 2,724 3,338 29)     (M54 41,G89 29)  9  Diffuse pain (780 96) (R52)  10  Fatigue (780 79) (R53 83)  11  IBS (irritable bowel syndrome) (564 1) (K58 9)  12  Low vitamin D level (268 9) (E55 9)  13  Menstrual migraine (346 40) (G43 829)  14  Myofascial pain (729 1) (M79 1)  15  Neck pain (723 1) (M54 2)  16  NUD (nonulcer dyspepsia) (536 8) (K30)  17  Obesity (278 00) (E66 9)  18  Pain in wrist, unspecified laterality (719 43) (M25 539)  19  Primary stabbing headache (339 85) (G44 85)  20  Reflex asymmetry (796 1) (R29 2)  21  RUQ pain (789 01) (R10 11)  22  Sore throat (462) (J02 9)  23  Vertigo (780 4) (R42)  24  Viral syndrome (079 99) (B34 9)  25  Vitamin B12 deficiency (266 2) (E53 8)    Past Medical History   1  History of migraine (V12 49) (Z86 69)  2  History of Neck muscle spasm (728 85) (N51 018)  Active Problems And Past Medical History Reviewed: The active problems and past medical history were reviewed and updated today  Family History   Mother   1  Family history of Dyslipidemia  Father   2  Family history of Dyslipidemia    Social History    · Never a smoker   · Social alcohol use (Z78 9)    Surgical History   1  History of Back Surgery  2  History of Esophagogastrostomy  3  History of Knee Surgery  4  History of Shoulder Surgery    Current Meds   1  DULoxetine HCl - 20 MG Oral Capsule Delayed Release Particles; Take 1 cap qHS x 2     weeks, then 2 caps qHS; Therapy: 25RHC2502 to (Last VI:00NXU8120)  Requested for: 2017 Ordered  2  Ketorolac Tromethamine 10 MG Oral Tablet; TAKE ONE TO TWO AS NEEDED FOR     HEADACHE, NO MORE than 2 TABLETS IN 24 HOURS and NO MORE THAN 3 TABLETS     IN ONE WEEK; Therapy: 71LIZ9735 to [phone])  Requested for: 11Xwr4398; Last     Rx:90Tzg1915; Status: [address] to Pharmacy - Awaiting Verification Ordered  3  Lactulose 10 GM/15ML Oral Solution; TAKE ONE TABLESPOONFUL BY MOUTH EVERY     DAY; Therapy: 2017 to ( 30)  Requested for: [address]; Last     Rx:2017; Status: ACTIVE - Transmit to Pharmacy - Awaiting Verification Ordered  4  Meclizine HCl - 12 5 MG Oral Tablet; as needed; Therapy: ([id number]) to Recorded  5  Multi-Day TABS; TAKE 1 TABLET DAILY; Therapy: (Recorded:04Wlv9344) to Recorded  6  Omeprazole 20 MG Oral Capsule Delayed Release; take 1 capsule by mouth once daily; Therapy: 06TPF2754 to (Evaluate:15Hqr6124)  Requested for: 2017; Last     Rx:2017 Ordered  7  Protriptyline HCl - 5 MG Oral Tablet; One in am for 10 days then two in am after that; Therapy: 73SUJ9632 to (Last Rx:2017)  Requested for: 66BZJ6784; Status: ACTIVE     - Transmit to [facility] Verification Ordered  8  Vitamin D (Ergocalciferol) 36979 UNIT Oral Capsule; TAKE 1 CAPSULE BY MOUTH     WEEKLY; Therapy: 2017 to (Evaluate:85Zmu3693)  Requested for: 73Bhc3233; Last     Rx:41Xxq1298; Status: ACTIVE - Transmit to Pharmacy - Awaiting Verification Ordered  Medication List Reviewed: The medication list was reviewed and updated today  Allergies   1   No Known Drug Allergies    Vitals   Signs   Recorded: 2017 07:56PM   Temperature: 97 4 F  Heart Rate: 78  Respiration: 20  Systolic: 088  Diastolic: 65  Height: 5 ft 4 in  Weight: 190 lb BMI Calculated: 32 61  BSA Calculated: 1 91  O2 Saturation: 98  Pain Scale: 6    Physical Exam        Constitutional      General appearance: No acute distress, well appearing and well nourished  Ears, Nose, Mouth, and Throat      External inspection of ears and nose: Normal        Otoscopic examination: Tympanic membranes translucent with normal light reflex  Canals patent without erythema  Nasal mucosa, septum, and turbinates: Abnormal  -- Mildly hypertrophic left turbinates  Oropharynx: Abnormal  -- Cobblestoning observed  Pulmonary      Respiratory effort: No increased work of breathing or signs of respiratory distress  Auscultation of lungs: Abnormal  -- Rhonchi is appreciated  Cardiovascular      Auscultation of heart: Normal rate and rhythm, normal S1 and S2, without murmurs  Lymphatic      Palpation of lymph nodes in neck: No lymphadenopathy         Signatures    Electronically signed by : MARGARET Arias ; Dec 23 2017  8:48PM EST                       (Author)     Electronically signed by : MARGARET Arias ; Dec 26 2017  3:01PM EST

## 2018-01-12 VITALS
HEIGHT: 64 IN | HEART RATE: 87 BPM | WEIGHT: 205.75 LBS | RESPIRATION RATE: 16 BRPM | SYSTOLIC BLOOD PRESSURE: 105 MMHG | DIASTOLIC BLOOD PRESSURE: 69 MMHG | BODY MASS INDEX: 35.13 KG/M2

## 2018-01-14 VITALS
DIASTOLIC BLOOD PRESSURE: 74 MMHG | SYSTOLIC BLOOD PRESSURE: 115 MMHG | HEART RATE: 88 BPM | HEIGHT: 64 IN | RESPIRATION RATE: 18 BRPM | WEIGHT: 213.5 LBS | BODY MASS INDEX: 36.45 KG/M2

## 2018-01-14 VITALS
BODY MASS INDEX: 37.39 KG/M2 | DIASTOLIC BLOOD PRESSURE: 80 MMHG | SYSTOLIC BLOOD PRESSURE: 124 MMHG | RESPIRATION RATE: 18 BRPM | HEIGHT: 64 IN | WEIGHT: 219 LBS | OXYGEN SATURATION: 99 % | HEART RATE: 83 BPM

## 2018-01-14 VITALS
HEIGHT: 64 IN | DIASTOLIC BLOOD PRESSURE: 80 MMHG | SYSTOLIC BLOOD PRESSURE: 120 MMHG | RESPIRATION RATE: 14 BRPM | HEART RATE: 70 BPM | WEIGHT: 202 LBS | OXYGEN SATURATION: 99 % | BODY MASS INDEX: 34.49 KG/M2

## 2018-01-14 VITALS
HEART RATE: 80 BPM | TEMPERATURE: 97.9 F | BODY MASS INDEX: 35.34 KG/M2 | HEIGHT: 64 IN | DIASTOLIC BLOOD PRESSURE: 78 MMHG | SYSTOLIC BLOOD PRESSURE: 106 MMHG | WEIGHT: 207 LBS

## 2018-01-14 VITALS
SYSTOLIC BLOOD PRESSURE: 110 MMHG | WEIGHT: 202 LBS | TEMPERATURE: 98.9 F | BODY MASS INDEX: 34.49 KG/M2 | DIASTOLIC BLOOD PRESSURE: 78 MMHG | HEIGHT: 64 IN | HEART RATE: 64 BPM

## 2018-01-23 VITALS
OXYGEN SATURATION: 98 % | BODY MASS INDEX: 32.44 KG/M2 | TEMPERATURE: 97.4 F | SYSTOLIC BLOOD PRESSURE: 119 MMHG | WEIGHT: 190 LBS | DIASTOLIC BLOOD PRESSURE: 65 MMHG | HEART RATE: 78 BPM | HEIGHT: 64 IN | RESPIRATION RATE: 20 BRPM

## 2018-01-29 DIAGNOSIS — M54.41 CHRONIC MIDLINE LOW BACK PAIN WITH RIGHT-SIDED SCIATICA: ICD-10-CM

## 2018-01-29 DIAGNOSIS — M79.18 MYOFASCIAL PAIN: ICD-10-CM

## 2018-01-29 DIAGNOSIS — G44.86 CERVICOGENIC HEADACHE: Primary | ICD-10-CM

## 2018-01-29 DIAGNOSIS — G89.29 CHRONIC MIDLINE LOW BACK PAIN WITH RIGHT-SIDED SCIATICA: ICD-10-CM

## 2018-01-30 RX ORDER — DULOXETINE 40 MG/1
CAPSULE, DELAYED RELEASE ORAL
Qty: 30 CAPSULE | Refills: 3 | Status: SHIPPED | OUTPATIENT
Start: 2018-01-30 | End: 2018-03-06 | Stop reason: ALTCHOICE

## 2018-02-05 ENCOUNTER — TELEPHONE (OUTPATIENT)
Dept: NEUROLOGY | Facility: CLINIC | Age: 33
End: 2018-02-05

## 2018-02-05 DIAGNOSIS — G43.709 CHRONIC MIGRAINE WITHOUT AURA WITHOUT STATUS MIGRAINOSUS, NOT INTRACTABLE: Primary | ICD-10-CM

## 2018-02-05 NOTE — TELEPHONE ENCOUNTER
It is possible for Cymb to cause sweating  She can wean down to 20 mg by taking 40 alternating with 20 mg QOD x 2 weeks, then 20 mg qd  I also would suggest gabapentin if she hasn't tried it before for headaches and neck pain/ low back pain if this is an issue

## 2018-02-05 NOTE — TELEPHONE ENCOUNTER
headache started on saturday  whole head hurts on l side  8/10,+nausea last night, light and sound sensitivity  cymbalta-40mg hs   toradol 10mg 1-2 tabs prn-took 2 tabs last night and 1 tab saturday, made less intense but then came back  took tylenol and advil and excedrine and not effective  last couple of months has been having night sweats, since starting cymbalta  is this a side effect of cymbalta?   Please send any meds to [facility]  [phone]

## 2018-02-06 PROBLEM — E55.9 VITAMIN D DEFICIENCY: Status: ACTIVE | Noted: 2018-02-06

## 2018-02-06 PROBLEM — G43.709 CHRONIC MIGRAINE WITHOUT AURA WITHOUT STATUS MIGRAINOSUS, NOT INTRACTABLE: Status: ACTIVE | Noted: 2018-02-06

## 2018-02-06 PROBLEM — E66.9 CLASS 1 OBESITY: Status: ACTIVE | Noted: 2018-02-06

## 2018-02-06 RX ORDER — GABAPENTIN 100 MG/1
CAPSULE ORAL
Qty: 90 CAPSULE | Refills: 2 | Status: SHIPPED | OUTPATIENT
Start: 2018-02-06 | End: 2019-01-23

## 2018-02-13 RX ORDER — CYCLOBENZAPRINE HCL 10 MG
10 TABLET ORAL
Qty: 30 TABLET | Refills: 2 | Status: SHIPPED | OUTPATIENT
Start: 2018-02-13 | End: 2018-12-19 | Stop reason: SDUPTHER

## 2018-02-13 NOTE — TELEPHONE ENCOUNTER
Called and advised pt   She will stop cymbalta as she is not experiencing any side effects or withdrawal sx

## 2018-02-13 NOTE — TELEPHONE ENCOUNTER
Patient called to report she reduced the duloxetine to 20mg daily on the 7th  She reports she is out of the 20mg now and she would like to come off of it completely  Please advise if she can stop it at this time  Patient is also questioning if she can have an rx for cyclobenzaprine 10mg as she has been taking (2) 5mg tabs

## 2018-02-13 NOTE — TELEPHONE ENCOUNTER
I will send flexeril  She can stop cymbalta if she denies worsening sxs or withdrawal like sxs at this point  If needed, will give her 20 mg cymb to take every other day for 2 weeks, then stop

## 2018-02-22 ENCOUNTER — TRANSCRIBE ORDERS (OUTPATIENT)
Dept: PAIN MEDICINE | Facility: MEDICAL CENTER | Age: 33
End: 2018-02-22

## 2018-02-22 DIAGNOSIS — M54.31 BACK PAIN WITH RIGHT-SIDED SCIATICA: Primary | ICD-10-CM

## 2018-02-22 DIAGNOSIS — M79.18 MYOFASCIAL PAIN SYNDROME: ICD-10-CM

## 2018-02-22 DIAGNOSIS — R29.2 REFLEX ASYMMETRY: ICD-10-CM

## 2018-02-22 DIAGNOSIS — G44.86 CERVICOGENIC HEADACHE: ICD-10-CM

## 2018-03-06 ENCOUNTER — CONSULT (OUTPATIENT)
Dept: PAIN MEDICINE | Facility: MEDICAL CENTER | Age: 33
End: 2018-03-06
Payer: COMMERCIAL

## 2018-03-06 VITALS
DIASTOLIC BLOOD PRESSURE: 80 MMHG | WEIGHT: 200.8 LBS | BODY MASS INDEX: 35.58 KG/M2 | RESPIRATION RATE: 12 BRPM | HEART RATE: 68 BPM | SYSTOLIC BLOOD PRESSURE: 116 MMHG | HEIGHT: 63 IN

## 2018-03-06 DIAGNOSIS — G89.29 CHRONIC RIGHT-SIDED LOW BACK PAIN WITHOUT SCIATICA: ICD-10-CM

## 2018-03-06 DIAGNOSIS — G44.86 CERVICOGENIC HEADACHE: ICD-10-CM

## 2018-03-06 DIAGNOSIS — M46.1 SACROILIITIS (HCC): Primary | ICD-10-CM

## 2018-03-06 DIAGNOSIS — M79.18 MYOFASCIAL PAIN SYNDROME: ICD-10-CM

## 2018-03-06 DIAGNOSIS — M54.50 CHRONIC RIGHT-SIDED LOW BACK PAIN WITHOUT SCIATICA: ICD-10-CM

## 2018-03-06 PROCEDURE — 99244 OFF/OP CNSLTJ NEW/EST MOD 40: CPT | Performed by: PHYSICAL MEDICINE & REHABILITATION

## 2018-03-06 RX ORDER — NORETHINDRONE ACETATE AND ETHINYL ESTRADIOL AND FERROUS FUMARATE 1MG-20(21)
KIT ORAL
COMMUNITY
Start: 2018-02-11 | End: 2018-03-09 | Stop reason: SINTOL

## 2018-03-06 NOTE — PROGRESS NOTES
Assessment:  1  Sacroiliitis ([address] )    2  Myofascial pain syndrome    3  Cervicogenic headache    4  Chronic right-sided low back pain without sciatica        Plan:  1  We will schedule patient for a right sacroiliac joint injection under fluoroscopic guidance  2  I recommend the patient pursue rheumatologic evaluation as ordered by Merissa Brooks    3  She will follow up with us after the injection depending on her response and we can look into further options for her cervical spine which may include medial branch nerve blocks  I did review the imaging studies with the patient  There is no indication currently for epidural steroid injections as there is no neural compression, no disc herniations, no foraminal or central canal stenosis  My impressions and treatment recommendations were discussed in detail with the patient who verbalized understanding and had no further questions  Discharge instructions were provided  I personally saw and examined the patient and I agree with the above discussed plan of care  Orders Placed This Encounter   Procedures    FL spine and pain procedure     Standing Status:   Future     Standing Expiration Date:   9/6/2018     Order Specific Question:   Reason for Exam:     Answer:   (R) SIJ injection     Order Specific Question:   Anticoagulant hold needed? Answer:   no     Order Specific Question:   Is the patient pregnant? Answer:   No     New Medications Ordered This Visit   Medications    MICROGESTIN FE 1/20 1-20 MG-MCG per tablet       History of Present Illness:    Naz De Leon is a 33 y o  female   Sent for consultation from Merissa Brooks related to chronic neck and back pain complaints  The patient is experiencing cervicogenic type headaches as well as right-sided lower back pain which is referred into the buttock, posterior thigh, and lateral thigh        She has been experiencing the symptoms for greater than 5 years without any inciting event or trauma and characterizes them as moderate to severe intensity rated as a 6 to 8/10 which is constant and worse at nighttime  She characterizes the pain as shooting, sharp, pins and needles, pressure-like  Aggravating factors include lying down, standing, bending, walking, coughing, sneezing, and menstruation  She is unable to determine any significant alleviating factors  Diagnostic studies include MRI of the cervical spine and lumbar spine  Please see report for full details  I did review the images with the patient in the exam room  She also had an EMG of her upper extremities which was normal       She has noted some moderate relief with physical therapy in the past but not recently  Moderate relief with local heat or ice application and no relief with exercise  Currently using Tylenol, Advil, Excedrin, and cyclobenzaprine and she does get some relief from this  I have personally reviewed and/or updated the patient's past medical history, past surgical history, family history, social history, current medications, allergies, and vital signs today  Review of Systems:    Review of Systems   Constitutional: Negative for fever and unexpected weight change  HENT: Negative for trouble swallowing  Eyes: Negative for visual disturbance  Respiratory: Positive for shortness of breath  Negative for wheezing  Cardiovascular: Negative for chest pain and palpitations  Gastrointestinal: Positive for nausea  Negative for constipation, diarrhea and vomiting  Endocrine: Negative for cold intolerance, heat intolerance and polydipsia  Genitourinary: Negative for difficulty urinating and frequency  Musculoskeletal: Negative for arthralgias, gait problem, joint swelling and myalgias  Skin: Negative for rash  Neurological: Positive for dizziness and weakness  Negative for seizures, syncope and headaches  Hematological: Does not bruise/bleed easily     Psychiatric/Behavioral: Negative for dysphoric mood  All other systems reviewed and are negative  Patient Active Problem List   Diagnosis    Class 1 obesity    Vitamin D deficiency    Chronic migraine without aura without status migrainosus, not intractable       Past Medical History:   Diagnosis Date    Abdominal pain     Anxiety     Asthma     Atypical face pain     Chronic constipation     Chronic midline low back pain     Diffuse pain     Fatigue     Headache, cervicogenic     Irritable bowel syndrome (IBS)     Low vitamin D level     Menstrual migraine     Myofacial muscle pain     Neck pain     NUD (nonulcer dyspepsia)     Obesity     Sore throat     Vertigo, benign paroxysmal     Viral syndrome     Vitamin B12 deficiency     Wrist pain, unspecified laterality        Past Surgical History:   Procedure Laterality Date    BACK SURGERY      ESOPHAGOGASTRODUODENOSCOPY      KNEE SURGERY      SHOULDER SURGERY         Family History   Problem Relation Age of Onset    Hyperlipidemia Mother     Hyperlipidemia Father        Social History     Occupational History    Not on file  Social History Main Topics    Smoking status: Never Smoker    Smokeless tobacco: Never Used    Alcohol use Yes      Comment: socially    Drug use: No    Sexual activity: Not on file       Current Outpatient Prescriptions on File Prior to Visit   Medication Sig    cyclobenzaprine (FLEXERIL) 10 mg tablet Take 1 tablet (10 mg total) by mouth daily at bedtime as needed for muscle spasms (or headache)    ergocalciferol (ERGOCALCIFEROL) 25124 units capsule Take 1 capsule by mouth once a week    gabapentin (NEURONTIN) 100 mg capsule 1 tab qhs x 5 days, then 2 tabs qhs x 5 days, then 3 tabs      Multiple Vitamin (MULTI-DAY) TABS Take 1 tablet by mouth daily    omeprazole (PriLOSEC) 20 mg delayed release capsule Take 1 capsule by mouth daily    [DISCONTINUED] azithromycin (ZITHROMAX Z-CARROL) 250 mg tablet 1 tablet    [DISCONTINUED] benzonatate (TESSALON PERLES) 100 mg capsule Take 1 capsule by mouth 3 (three) times a day as needed    [DISCONTINUED] DULoxetine (CYMBALTA) 20 mg capsule Take by mouth    [DISCONTINUED] DULoxetine HCl 40 MG CPEP Take 1 at bedtime    [DISCONTINUED] ketorolac (TORADOL) 10 mg tablet Take by mouth    [DISCONTINUED] lactulose (CHRONULAC) 10 g/15 mL solution Take by mouth    [DISCONTINUED] protriptyline (VIVACTIL) 5 MG tablet Take by mouth     No current facility-administered medications on file prior to visit  No Known Allergies    Physical Exam:    /80   Pulse 68   Resp 12   Ht 5' 3" (1 6 m)   Wt 91 1 kg (200 lb 12 8 oz)   BMI 35 57 kg/m²     CERVICAL  General: Well-developed, well-nourished individual in no acute distress  Mental: Appropriate mood and affect  Grossly oriented with coherent speech and thought processing   Sinan's score: Sinan's score is 0/5  Neuro:  Cranial nerves: Cranial nerve function is grossly intact bilaterally   Strength: Bilateral upper extremity strength is normal and symmetric   No atrophy or tone abnormalities noted   Reflexes: Bilateral upper extremity muscle stretch reflexes are physiologic and symmetric   No Wheat sign   Sensation: No loss of sensation is noted EXCEPT FOR DECREASED LIGHT TOUCH SENSATION OVER THE LEFT THUMB AND DECREASED PINPRICK SENSITIVITY IN THIS AREA AS WELL  Foraminal Compression Maneuvers:  Spurling sign is absent   Gait:  Gait/gross motor: Gait is normal  Station is normal      Musculoskeletal:  Palpation: Inspection and palpation of the spine and extremities are unremarkable EXCEPT FOR TENDERNESS OVER THE UPPER CERVICAL FACET JOINTS BILATERALLY REPRODUCING SOME OF HER PAIN COMPLAINTS, SHE ALSO HAS MUSCULAR TENDERNESS OVER THE TRAPEZIUS BILATERALLY REPRODUCING SOME OF HER PAIN  Joint ROM: Peripheral joint range of motion is full and pain free without obvious instability or laxity in all four extremities   No gross bony deformity noted   Spine: Normal pain-free range of motion   No gross axial skeletal deformities   Skin: Skin inspection grossly negative for erythema, breakdown, or concerning lesions in affected area   LUMBAR  Neuro:  Strength: Bilateral lower extremity strength is normal and symmetric   No atrophy or tone abnormalities noted   Reflexes: Bilateral lower extremity muscle stretch reflexes are physiologic and symmetric   No ankle clonus is noted   Sensation: No loss of sensation is noted   SLR/Foraminal Compression Maneuvers: Straight leg raising in the sitting and supine position is negative for radicular pain   Gait:  Gait/gross motor: Gait is normal  Station is normal  Toe walking, heel walking  are normal     Musculoskeletal:  Palpation: Inspection and palpation of the spine and extremities are unremarkable EXCEPT FOR TENDERNESS OVER THE RIGHT SACROILIAC JOINT REPRODUCING SOME OF HER PAIN  Spine: Normal pain-free range of motion EXCEPT FOR LUMBAR EXTENSION WHICH IS LIMITED AND ALSO REPRODUCES SOME PAIN  No gross axial skeletal deformities   Skin: Skin inspection grossly negative for erythema, breakdown, or concerning lesions in affected area   Lymph: No lymphadenopathy is appreciated in the involved extremity   Vessels: No lower extremity edema   Lungs: Breathing is comfortable and regular  No dyspnea noted during examination   Eyes: Visual field grossly intact to confrontation  No redness appreciated  ENT: No craniofacial deformities or asymmetry  No neck masses appreciated  Imaging  MRI CERVICAL SPINE WITHOUT CONTRAST     INDICATION:  M54 2: Cervicalgia  R29 2: Abnormal reflex   History taken directly from the electronic ordering system          COMPARISON:  None      TECHNIQUE:  Sagittal T1, sagittal T2, sagittal inversion recovery, axial T2, axial  2D merge          IMAGE QUALITY:  Diagnostic     FINDINGS:     ALIGNMENT:  There is nonspecific straightening of the cervical lordosis without subluxation       MARROW SIGNAL:  Normal marrow signal is identified within the visualized bony structures  No discrete marrow lesion      CERVICAL AND VISUALIZED THORACIC CORD:  Normal signal within the visualized cord      PREVERTEBRAL AND PARASPINAL SOFT TISSUES:  Prevertebral and paraspinal soft tissues are unremarkable      VISUALIZED POSTERIOR FOSSA:  The visualized posterior fossa demonstrates no abnormal signal      CERVICAL DISC SPACES:          C2-C3:  Normal      C3-C4:  Normal      C4-C5:  Normal      C5-C6:  Normal      C6-C7:  There is a diffuse disk bulge  No significant central canal or neural foraminal narrowing       C7-T1:  Normal      UPPER THORACIC DISC SPACES:  Normal      IMPRESSION:     There is nonspecific straightening of the cervical lordosis without subluxation        MRI LUMBAR SPINE WITHOUT CONTRAST     INDICATION:  R29 2: Abnormal reflex  M54 41: Lumbago with sciatica, right side  History taken directly from the electronic ordering system  Pain, numbness and tingling down the left leg, worsening for last 3 months  History of previous injury and prior surgery in 2003      COMPARISON:  None      TECHNIQUE:  Sagittal T1, sagittal T2, sagittal inversion recovery, axial T1 and axial T2, coronal T2        IMAGE QUALITY:  Diagnostic     FINDINGS:     ALIGNMENT:  Slight levoscoliosis mid lumbar spine may be positional   No subluxation  Normal lordosis      MARROW SIGNAL:  Signal abnormality in the posterior elements of L5-S1 noted compatible the history of previous surgery  No bone marrow edema or focally suspicious marrow lesions      DISTAL CORD AND CONUS:  Normal size and signal within the distal cord and conus  The conus ends at the L1 level      PARASPINAL SOFT TISSUES:  Paraspinal soft tissues are unremarkable      SACRUM:  Normal signal within the sacrum   No evidence of insufficiency or stress fracture      LOWER THORACIC DISC SPACES: Normal disc height and signal   No disc herniation, canal stenosis or foraminal narrowing      LUMBAR DISC SPACES:          L1-L2:  Normal      L2-L3:  Normal      L3-L4:  Normal      L4-L5:  There is a diffuse disk bulge  No significant central canal or neural foraminal narrowing       L5-S1:  There is a diffuse disk bulge  No significant central canal or neural foraminal narrowing  No evidence of recurrent or residual disc herniation at this level       IMPRESSION:     There is no focal disk herniation, central canal or neural foraminal narrowing  Postoperative changes of the lumbosacral junction noted

## 2018-03-06 NOTE — LETTER
March 6, 2018     Mary Benito, [address] [facility] [address]    Patient: Malina Millstone   YOB: 1985   Date of Visit: 3/6/2018       Dear Dr Mali Parker:    Thank you for referring Nura Chandler to me for evaluation  Below are my notes for this consultation  If you have questions, please do not hesitate to call me  I look forward to following your patient along with you  Sincerely,        Damion Zaragoza DO        CC: MD Damion Malin DO  3/6/2018  7:46 AM  Sign at close encounter  Assessment:  1  Sacroiliitis ([address] )    2  Myofascial pain syndrome    3  Cervicogenic headache    4  Chronic right-sided low back pain without sciatica        Plan:  1  We will schedule patient for a right sacroiliac joint injection under fluoroscopic guidance  2  I recommend the patient pursue rheumatologic evaluation as ordered by Mari Coughlin    3  She will follow up with us after the injection depending on her response and we can look into further options for her cervical spine which may include medial branch nerve blocks  I did review the imaging studies with the patient  There is no indication currently for epidural steroid injections as there is no neural compression, no disc herniations, no foraminal or central canal stenosis  My impressions and treatment recommendations were discussed in detail with the patient who verbalized understanding and had no further questions  Discharge instructions were provided  I personally saw and examined the patient and I agree with the above discussed plan of care  Orders Placed This Encounter   Procedures    FL spine and pain procedure     Standing Status:   Future     Standing Expiration Date:   9/6/2018     Order Specific Question:   Reason for Exam:     Answer:   (R) SIJ injection     Order Specific Question:   Anticoagulant hold needed? Answer:   no     Order Specific Question:   Is the patient pregnant?      Answer: No     New Medications Ordered This Visit   Medications    MICROGESTIN FE 1/20 1-20 MG-MCG per tablet       History of Present Illness:    Mauri Kelly is a 33 y o  female   Sent for consultation from [facility] related to chronic neck and back pain complaints  The patient is experiencing cervicogenic type headaches as well as right-sided lower back pain which is referred into the buttock, posterior thigh, and lateral thigh  She has been experiencing the symptoms for greater than 5 years without any inciting event or trauma and characterizes them as moderate to severe intensity rated as a 6 to 8/10 which is constant and worse at nighttime  She characterizes the pain as shooting, sharp, pins and needles, pressure-like  Aggravating factors include lying down, standing, bending, walking, coughing, sneezing, and menstruation  She is unable to determine any significant alleviating factors  Diagnostic studies include MRI of the cervical spine and lumbar spine  Please see report for full details  I did review the images with the patient in the exam room  She also had an EMG of her upper extremities which was normal       She has noted some moderate relief with physical therapy in the past but not recently  Moderate relief with local heat or ice application and no relief with exercise  Currently using Tylenol, Advil, Excedrin, and cyclobenzaprine and she does get some relief from this  I have personally reviewed and/or updated the patient's past medical history, past surgical history, family history, social history, current medications, allergies, and vital signs today  Review of Systems:    Review of Systems   Constitutional: Negative for fever and unexpected weight change  HENT: Negative for trouble swallowing  Eyes: Negative for visual disturbance  Respiratory: Positive for shortness of breath  Negative for wheezing      Cardiovascular: Negative for chest pain and palpitations  Gastrointestinal: Positive for nausea  Negative for constipation, diarrhea and vomiting  Endocrine: Negative for cold intolerance, heat intolerance and polydipsia  Genitourinary: Negative for difficulty urinating and frequency  Musculoskeletal: Negative for arthralgias, gait problem, joint swelling and myalgias  Skin: Negative for rash  Neurological: Positive for dizziness and weakness  Negative for seizures, syncope and headaches  Hematological: Does not bruise/bleed easily  Psychiatric/Behavioral: Negative for dysphoric mood  All other systems reviewed and are negative  Patient Active Problem List   Diagnosis    Class 1 obesity    Vitamin D deficiency    Chronic migraine without aura without status migrainosus, not intractable       Past Medical History:   Diagnosis Date    Abdominal pain     Anxiety     Asthma     Atypical face pain     Chronic constipation     Chronic midline low back pain     Diffuse pain     Fatigue     Headache, cervicogenic     Irritable bowel syndrome (IBS)     Low vitamin D level     Menstrual migraine     Myofacial muscle pain     Neck pain     NUD (nonulcer dyspepsia)     Obesity     Sore throat     Vertigo, benign paroxysmal     Viral syndrome     Vitamin B12 deficiency     Wrist pain, unspecified laterality        Past Surgical History:   Procedure Laterality Date    BACK SURGERY      ESOPHAGOGASTRODUODENOSCOPY      KNEE SURGERY      SHOULDER SURGERY         Family History   Problem Relation Age of Onset    Hyperlipidemia Mother     Hyperlipidemia Father        Social History     Occupational History    Not on file       Social History Main Topics    Smoking status: Never Smoker    Smokeless tobacco: Never Used    Alcohol use Yes      Comment: socially    Drug use: No    Sexual activity: Not on file       Current Outpatient Prescriptions on File Prior to Visit   Medication Sig    cyclobenzaprine (FLEXERIL) 10 mg tablet Take 1 tablet (10 mg total) by mouth daily at bedtime as needed for muscle spasms (or headache)    ergocalciferol (ERGOCALCIFEROL) 00998 units capsule Take 1 capsule by mouth once a week    gabapentin (NEURONTIN) 100 mg capsule 1 tab qhs x 5 days, then 2 tabs qhs x 5 days, then 3 tabs   Multiple Vitamin (MULTI-DAY) TABS Take 1 tablet by mouth daily    omeprazole (PriLOSEC) 20 mg delayed release capsule Take 1 capsule by mouth daily    [DISCONTINUED] azithromycin (ZITHROMAX Z-CARROL) 250 mg tablet 1 tablet    [DISCONTINUED] benzonatate (TESSALON PERLES) 100 mg capsule Take 1 capsule by mouth 3 (three) times a day as needed    [DISCONTINUED] DULoxetine (CYMBALTA) 20 mg capsule Take by mouth    [DISCONTINUED] DULoxetine HCl 40 MG CPEP Take 1 at bedtime    [DISCONTINUED] ketorolac (TORADOL) 10 mg tablet Take by mouth    [DISCONTINUED] lactulose (CHRONULAC) 10 g/15 mL solution Take by mouth    [DISCONTINUED] protriptyline (VIVACTIL) 5 MG tablet Take by mouth     No current facility-administered medications on file prior to visit  No Known Allergies    Physical Exam:    /80   Pulse 68   Resp 12   Ht 5' 3" (1 6 m)   Wt 91 1 kg (200 lb 12 8 oz)   BMI 35 57 kg/m²      CERVICAL  General: Well-developed, well-nourished individual in no acute distress  Mental: Appropriate mood and affect  Grossly oriented with coherent speech and thought processing   Sinan's score: Sinan's score is 0/5  Neuro:  Cranial nerves: Cranial nerve function is grossly intact bilaterally   Strength: Bilateral upper extremity strength is normal and symmetric   No atrophy or tone abnormalities noted   Reflexes: Bilateral upper extremity muscle stretch reflexes are physiologic and symmetric   No Wheat sign   Sensation: No loss of sensation is noted EXCEPT FOR DECREASED LIGHT TOUCH SENSATION OVER THE LEFT THUMB AND DECREASED PINPRICK SENSITIVITY IN THIS AREA AS WELL    Foraminal Compression Maneuvers: Spurling sign is absent   Gait:  Gait/gross motor: Gait is normal  Station is normal      Musculoskeletal:  Palpation: Inspection and palpation of the spine and extremities are unremarkable EXCEPT FOR TENDERNESS OVER THE UPPER CERVICAL FACET JOINTS BILATERALLY REPRODUCING SOME OF HER PAIN COMPLAINTS, SHE ALSO HAS MUSCULAR TENDERNESS OVER THE TRAPEZIUS BILATERALLY REPRODUCING SOME OF HER PAIN  Joint ROM: Peripheral joint range of motion is full and pain free without obvious instability or laxity in all four extremities   No gross bony deformity noted   Spine: Normal pain-free range of motion   No gross axial skeletal deformities   Skin: Skin inspection grossly negative for erythema, breakdown, or concerning lesions in affected area   LUMBAR  Neuro:  Strength: Bilateral lower extremity strength is normal and symmetric   No atrophy or tone abnormalities noted   Reflexes: Bilateral lower extremity muscle stretch reflexes are physiologic and symmetric   No ankle clonus is noted   Sensation: No loss of sensation is noted   SLR/Foraminal Compression Maneuvers: Straight leg raising in the sitting and supine position is negative for radicular pain   Gait:  Gait/gross motor: Gait is normal  Station is normal  Toe walking, heel walking  are normal     Musculoskeletal:  Palpation: Inspection and palpation of the spine and extremities are unremarkable EXCEPT FOR TENDERNESS OVER THE RIGHT SACROILIAC JOINT REPRODUCING SOME OF HER PAIN  Spine: Normal pain-free range of motion EXCEPT FOR LUMBAR EXTENSION WHICH IS LIMITED AND ALSO REPRODUCES SOME PAIN  No gross axial skeletal deformities   Skin: Skin inspection grossly negative for erythema, breakdown, or concerning lesions in affected area   Lymph: No lymphadenopathy is appreciated in the involved extremity   Vessels: No lower extremity edema   Lungs: Breathing is comfortable and regular  No dyspnea noted during examination       Eyes: Visual field grossly intact to confrontation  No redness appreciated  ENT: No craniofacial deformities or asymmetry  No neck masses appreciated  Imaging  MRI CERVICAL SPINE WITHOUT CONTRAST     INDICATION:  M54 2: Cervicalgia  R29 2: Abnormal reflex  History taken directly from the electronic ordering system          COMPARISON:  None      TECHNIQUE:  Sagittal T1, sagittal T2, sagittal inversion recovery, axial T2, axial  2D merge          IMAGE QUALITY:  Diagnostic     FINDINGS:     ALIGNMENT:  There is nonspecific straightening of the cervical lordosis without subluxation       MARROW SIGNAL:  Normal marrow signal is identified within the visualized bony structures  No discrete marrow lesion      CERVICAL AND VISUALIZED THORACIC CORD:  Normal signal within the visualized cord      PREVERTEBRAL AND PARASPINAL SOFT TISSUES:  Prevertebral and paraspinal soft tissues are unremarkable      VISUALIZED POSTERIOR FOSSA:  The visualized posterior fossa demonstrates no abnormal signal      CERVICAL DISC SPACES:          C2-C3:  Normal      C3-C4:  Normal      C4-C5:  Normal      C5-C6:  Normal      C6-C7:  There is a diffuse disk bulge  No significant central canal or neural foraminal narrowing       C7-T1:  Normal      UPPER THORACIC DISC SPACES:  Normal      IMPRESSION:     There is nonspecific straightening of the cervical lordosis without subluxation        MRI LUMBAR SPINE WITHOUT CONTRAST     INDICATION:  R29 2: Abnormal reflex  M54 41: Lumbago with sciatica, right side  History taken directly from the electronic ordering system  Pain, numbness and tingling down the left leg, worsening for last 3 months    History of previous injury and prior surgery in 2003      COMPARISON:  None      TECHNIQUE:  Sagittal T1, sagittal T2, sagittal inversion recovery, axial T1 and axial T2, coronal T2        IMAGE QUALITY:  Diagnostic     FINDINGS:     ALIGNMENT:  Slight levoscoliosis mid lumbar spine may be positional   No subluxation  Normal lordosis      MARROW SIGNAL:  Signal abnormality in the posterior elements of L5-S1 noted compatible the history of previous surgery  No bone marrow edema or focally suspicious marrow lesions      DISTAL CORD AND CONUS:  Normal size and signal within the distal cord and conus  The conus ends at the L1 level      PARASPINAL SOFT TISSUES:  Paraspinal soft tissues are unremarkable      SACRUM:  Normal signal within the sacrum  No evidence of insufficiency or stress fracture      LOWER THORACIC DISC SPACES:  Normal disc height and signal   No disc herniation, canal stenosis or foraminal narrowing      LUMBAR DISC SPACES:          L1-L2:  Normal      L2-L3:  Normal      L3-L4:  Normal      L4-L5:  There is a diffuse disk bulge  No significant central canal or neural foraminal narrowing       L5-S1:  There is a diffuse disk bulge  No significant central canal or neural foraminal narrowing  No evidence of recurrent or residual disc herniation at this level       IMPRESSION:     There is no focal disk herniation, central canal or neural foraminal narrowing  Postoperative changes of the lumbosacral junction noted

## 2018-03-09 ENCOUNTER — OFFICE VISIT (OUTPATIENT)
Dept: BARIATRICS | Facility: CLINIC | Age: 33
End: 2018-03-09
Payer: COMMERCIAL

## 2018-03-09 VITALS
WEIGHT: 199.5 LBS | RESPIRATION RATE: 16 BRPM | TEMPERATURE: 97.7 F | HEIGHT: 64 IN | DIASTOLIC BLOOD PRESSURE: 70 MMHG | BODY MASS INDEX: 34.06 KG/M2 | SYSTOLIC BLOOD PRESSURE: 120 MMHG | HEART RATE: 80 BPM

## 2018-03-09 DIAGNOSIS — E53.8 VITAMIN B12 DEFICIENCY: ICD-10-CM

## 2018-03-09 DIAGNOSIS — R63.5 ABNORMAL WEIGHT GAIN: Primary | ICD-10-CM

## 2018-03-09 DIAGNOSIS — E66.9 CLASS 1 OBESITY: ICD-10-CM

## 2018-03-09 DIAGNOSIS — G43.709 CHRONIC MIGRAINE WITHOUT AURA WITHOUT STATUS MIGRAINOSUS, NOT INTRACTABLE: ICD-10-CM

## 2018-03-09 DIAGNOSIS — E55.9 VITAMIN D DEFICIENCY: ICD-10-CM

## 2018-03-09 PROCEDURE — 99204 OFFICE O/P NEW MOD 45 MIN: CPT | Performed by: INTERNAL MEDICINE

## 2018-03-09 NOTE — PROGRESS NOTES
Assessment/Plan:    Vitamin B12 deficiency  Recheck B12 and MMA    Vitamin D deficiency  Recheck vit D    Class 1 obesity  -Discussed options of [facility]-Intensive Lifestyle Intervention Program, Very Low Calorie Diet-VLCD and Conservative Program and the role of weight loss medications   -Initial weight loss goal of 5-10% weight loss for improved health  -Screening labs-check TSH, insulin and CMP  -Patient is interested in pursuing VLCD      Chronic migraine without aura without status migrainosus, not intractable  Follows w/ neurology  On gabapentin          Diagnoses and all orders for this visit:    Abnormal weight gain  -     TSH, 3rd generation with T4 reflex; Future  -     Comprehensive metabolic panel; Future  -     Insulin, fasting; Future    Chronic migraine without aura without status migrainosus, not intractable    Vitamin B12 deficiency  -     Vitamin B12; Future  -     Methylmalonic acid, serum; Future    Vitamin D deficiency  -     Vitamin D 25 hydroxy; Future    Class 1 obesity  -     TSH, 3rd generation with T4 reflex; Future  -     Comprehensive metabolic panel; Future  -     Insulin, fasting; Future          Subjective:   Chief Complaint   Patient presents with   [address] [place] [id number] patient here for MW consult  Patient ID: Isaac Gracia  is a 33 y o  female with excess weight here to pursue weight managment      Past Medical History:   Diagnosis Date    Abdominal pain     Anxiety     Asthma     Atypical face pain     Chronic constipation     Chronic midline low back pain     Diffuse pain     Fatigue     Headache, cervicogenic     Irritable bowel syndrome (IBS)     Low vitamin D level     Menstrual migraine     Myofacial muscle pain     Neck pain     NUD (nonulcer dyspepsia)     Obesity     Sore throat     Vertigo, benign paroxysmal     Viral syndrome     Vitamin B12 deficiency     Wrist pain, unspecified laterality          HPI:  Obesity/Excess Weight:  Severity: Mild  Onset:  Past 8 years    Modifiers: Diet and Exercise  Contributing factors: Insufficient Physical Activity, Pregnancy and Depression  Associated symptoms: increased joint pain, decreased exercise capacity, body image issues and increased shortness of breath    Goals: 150  Works nights  Wake up-3pm  Sleeps-10a    6a B-[id number] w/ 2 % milk or bagel w/ CC or butter w/ OJ or water  L-michael w/ hummus or cheese stick  4h-odpkt-6-3 times/week sometimes w/ green beans/spinach/mushroom  11p-crackers or dried fruit  130a-michael w/ hummus or sandwich  Drinks:water-3 large bottles, 1 cup of OJ w/ and 1 iced tea    Exercise:walks    The following portions of the patient's history were reviewed and updated as appropriate: allergies, current medications, past family history, past medical history, past social history, past surgical history and problem list     Review of Systems   Constitutional: Negative for fever  HENT: Negative for sore throat  Respiratory: Positive for shortness of breath (w/ exertion)  Cardiovascular: Positive for chest pain (sharp, w/ exertion)  Negative for palpitations  Gastrointestinal: Positive for abdominal distention, abdominal pain and diarrhea  Occasional heartburn   Endocrine: Positive for heat intolerance  Genitourinary: Negative for difficulty urinating  Musculoskeletal: Positive for arthralgias and back pain  Neurological: Positive for headaches  Psychiatric/Behavioral: The patient is nervous/anxious  Objective:     Physical Exam   Nursing note and vitals reviewed  45 minute visit, >50% face-to-face time spent counseling patient on nonsurgical interventions for the treatment of excess weight  Discussed in detail nonsurgical options including intensive lifestyle intervention program, very low-calorie diet program and conservative program   Discussed the role of weight loss medications    Counseled patient on diet behavior and exercise modification for weight loss

## 2018-03-09 NOTE — ASSESSMENT & PLAN NOTE
-Discussed options of [facility]-Intensive Lifestyle Intervention Program, Very Low Calorie Diet-VLCD and Conservative Program and the role of weight loss medications   -Initial weight loss goal of 5-10% weight loss for improved health  -Screening labs-check TSH, insulin and CMP  -Patient is interested in pursuing VLCD

## 2018-03-12 ENCOUNTER — TELEPHONE (OUTPATIENT)
Dept: NEUROLOGY | Facility: CLINIC | Age: 33
End: 2018-03-12

## 2018-03-12 DIAGNOSIS — M54.41 CHRONIC BILATERAL LOW BACK PAIN WITH RIGHT-SIDED SCIATICA: Primary | ICD-10-CM

## 2018-03-12 DIAGNOSIS — M79.18 MYOFASCIAL PAIN: ICD-10-CM

## 2018-03-12 DIAGNOSIS — G89.29 CHRONIC BILATERAL LOW BACK PAIN WITH RIGHT-SIDED SCIATICA: Primary | ICD-10-CM

## 2018-03-12 NOTE — TELEPHONE ENCOUNTER
You referred pt to Rheum in October, she is trying to schedule an appt at [facility] (different dr than the referral), she is asking for an updated general Rheum referral to be faxed to Fax: [phone]  Please let us know when ordered and we can fax

## 2018-03-20 DIAGNOSIS — E55.9 VITAMIN D DEFICIENCY: Primary | ICD-10-CM

## 2018-03-20 DIAGNOSIS — K21.00 GASTROESOPHAGEAL REFLUX DISEASE WITH ESOPHAGITIS: ICD-10-CM

## 2018-03-20 RX ORDER — ERGOCALCIFEROL 1.25 MG/1
CAPSULE ORAL
Qty: 4 CAPSULE | Refills: 0 | Status: SHIPPED | OUTPATIENT
Start: 2018-03-20 | End: 2018-05-10 | Stop reason: ALTCHOICE

## 2018-03-20 RX ORDER — OMEPRAZOLE 20 MG/1
CAPSULE, DELAYED RELEASE ORAL
Qty: 30 CAPSULE | Refills: 2 | Status: SHIPPED | OUTPATIENT
Start: 2018-03-20 | End: 2019-02-13 | Stop reason: SDUPTHER

## 2018-03-23 ENCOUNTER — OFFICE VISIT (OUTPATIENT)
Dept: BARIATRICS | Facility: CLINIC | Age: 33
End: 2018-03-23

## 2018-03-23 VITALS — BODY MASS INDEX: 33.9 KG/M2 | WEIGHT: 198.6 LBS | HEIGHT: 64 IN

## 2018-03-23 DIAGNOSIS — R63.5 ABNORMAL WEIGHT GAIN: ICD-10-CM

## 2018-03-23 PROCEDURE — RECHECK: Performed by: DIETITIAN, REGISTERED

## 2018-03-23 PROCEDURE — VLCD: Performed by: DIETITIAN, REGISTERED

## 2018-03-26 ENCOUNTER — HOSPITAL ENCOUNTER (OUTPATIENT)
Dept: RADIOLOGY | Facility: MEDICAL CENTER | Age: 33
Discharge: HOME | End: 2018-03-26
Attending: PHYSICAL MEDICINE & REHABILITATION | Admitting: PHYSICAL MEDICINE & REHABILITATION
Payer: COMMERCIAL

## 2018-03-26 VITALS
TEMPERATURE: 97.9 F | HEART RATE: 75 BPM | SYSTOLIC BLOOD PRESSURE: 117 MMHG | OXYGEN SATURATION: 100 % | DIASTOLIC BLOOD PRESSURE: 85 MMHG | RESPIRATION RATE: 20 BRPM

## 2018-03-26 DIAGNOSIS — M46.1 SACROILIITIS (HCC): ICD-10-CM

## 2018-03-26 PROCEDURE — 27096 INJECT SACROILIAC JOINT: CPT | Performed by: PHYSICAL MEDICINE & REHABILITATION

## 2018-03-26 RX ORDER — BUPIVACAINE HCL/PF 2.5 MG/ML
10 VIAL (ML) INJECTION ONCE
Status: COMPLETED | OUTPATIENT
Start: 2018-03-26 | End: 2018-03-26

## 2018-03-26 RX ORDER — METHYLPREDNISOLONE ACETATE 40 MG/ML
40 INJECTION, SUSPENSION INTRA-ARTICULAR; INTRALESIONAL; INTRAMUSCULAR; PARENTERAL; SOFT TISSUE ONCE
Status: COMPLETED | OUTPATIENT
Start: 2018-03-26 | End: 2018-03-26

## 2018-03-26 RX ORDER — ACETAMINOPHEN, ASPIRIN AND CAFFEINE 250; 250; 65 MG/1; MG/1; MG/1
2 TABLET, FILM COATED ORAL DAILY
COMMUNITY

## 2018-03-26 RX ORDER — LIDOCAINE HYDROCHLORIDE 10 MG/ML
5 INJECTION, SOLUTION EPIDURAL; INFILTRATION; INTRACAUDAL; PERINEURAL ONCE
Status: COMPLETED | OUTPATIENT
Start: 2018-03-26 | End: 2018-03-26

## 2018-03-26 RX ORDER — IBUPROFEN 400 MG/1
400 TABLET ORAL EVERY 6 HOURS PRN
COMMUNITY

## 2018-03-26 RX ADMIN — METHYLPREDNISOLONE ACETATE 40 MG: 40 INJECTION, SUSPENSION INTRA-ARTICULAR; INTRALESIONAL; INTRAMUSCULAR; PARENTERAL; SOFT TISSUE at 08:21

## 2018-03-26 RX ADMIN — IOHEXOL 0.5 ML: 300 INJECTION, SOLUTION INTRAVENOUS at 08:21

## 2018-03-26 RX ADMIN — LIDOCAINE HYDROCHLORIDE 1 ML: 10 INJECTION, SOLUTION EPIDURAL; INFILTRATION; INTRACAUDAL; PERINEURAL at 08:21

## 2018-03-26 RX ADMIN — Medication 1.5 ML: at 08:21

## 2018-03-26 NOTE — DISCHARGE INSTRUCTIONS
Steroid Joint Injection   WHAT YOU NEED TO KNOW:   A steroid joint injection is a procedure to inject steroid medicine into a joint  Steroid medicine decreases pain and inflammation  The injection may also contain an anesthetic (numbing medicine) to decrease pain  It may be done to treat conditions such as arthritis, gout, or carpal tunnel syndrome  The injections may be given in your knee, ankle, shoulder, elbow, wrist, ankle or sacroiliac joint  1  Do not apply heat to any area that is numb  If you have discomfort or soreness at the injection site, you may apply ice today, 20 minutes on and 20 minutes off  Tomorrow you may use ice or warm, moist heat  Do not apply ice or heat directly to the skin  2  You may have an increase or change in the discomfort for 36-48 hours after your treatment  Apply ice and continue with any pain medicine you have been prescribed  3  Do not do anything strenuous today  You may shower, but no tub baths or hot tubs today  You may resume your normal activities tomorrow, but do not overdo it  Resume normal activities slowly when you are feeling better  4  If you experience redness, drainage or swelling at the injection site, or if you develop a fever above 100 degrees, please call [facility] at [phone] or go to the Emergency Room  5  Continue to take all routine medicines prescribed by your primary care physician unless otherwise instructed by our staff  Most blood thinners should be started again according to your regularly scheduled dosing  If you have any questions, please give our office a call  If you have a problem specifically related to your procedure, please call our office at [phone]  Problems not related to your procedure should be directed to your primary care physician

## 2018-03-26 NOTE — H&P
History of Present Illness: The patient is a 33 y o  female who presents with complaints of   Right-sided lower back pain    Patient Active Problem List   Diagnosis    Class 1 obesity    Vitamin D deficiency    Chronic migraine without aura without status migrainosus, not intractable    Vitamin B12 deficiency       Past Medical History:   Diagnosis Date    Abdominal pain     Anxiety     Asthma     Atypical face pain     Chronic constipation     Chronic midline low back pain     Diffuse pain     Fatigue     Headache, cervicogenic     Irritable bowel syndrome (IBS)     Low vitamin D level     Menstrual migraine     Myofacial muscle pain     Neck pain     NUD (nonulcer dyspepsia)     Obesity     Sore throat     Vertigo, benign paroxysmal     Viral syndrome     Vitamin B12 deficiency     Wrist pain, unspecified laterality        Past Surgical History:   Procedure Laterality Date    BACK SURGERY      ESOPHAGOGASTRODUODENOSCOPY      KNEE SURGERY      SHOULDER SURGERY           Current Outpatient Prescriptions:     aspirin-acetaminophen-caffeine (EXCEDRIN MIGRAINE) 250-250-65 MG per tablet, Take 2 tablets by mouth daily, Disp: , Rfl:     cyclobenzaprine (FLEXERIL) 10 mg tablet, Take 1 tablet (10 mg total) by mouth daily at bedtime as needed for muscle spasms (or headache), Disp: 30 tablet, Rfl: 2    ergocalciferol (VITAMIN D2) 50,000 units, Take 1 capsule by mouth weekly, Disp: 4 capsule, Rfl: 0    gabapentin (NEURONTIN) 100 mg capsule, 1 tab qhs x 5 days, then 2 tabs qhs x 5 days, then 3 tabs  , Disp: 90 capsule, Rfl: 2    ibuprofen (MOTRIN) 400 mg tablet, Take 400 mg by mouth every 6 (six) hours as needed for mild pain, Disp: , Rfl:     Multiple Vitamin (MULTI-DAY) TABS, Take 1 tablet by mouth daily, Disp: , Rfl:     omeprazole (PriLOSEC) 20 mg delayed release capsule, TAKE ONE CAPSULE BY MOUTH EVERY DAY , Disp: 30 capsule, Rfl: 2    Current Facility-Administered Medications:    bupivacaine (PF) (MARCAINE) 0 25 % injection 10 mL, 10 mL, Intra-articular, Once, Alfredo Aquino, DO    iohexol (OMNIPAQUE) 300 mg/mL injection 50 mL, 50 mL, Intra-articular, Once, Alfredo Aquino, DO    lidocaine (PF) (XYLOCAINE-MPF) 1 % injection 5 mL, 5 mL, Infiltration, Once, Alfredo Aquino, DO    methylPREDNISolone acetate (DEPO-MEDROL) injection 40 mg, 40 mg, Intra-articular, Once, Alfredo Aquino, DO    No Known Allergies    Physical Exam:   Vitals:    03/26/18 0803   BP: 131/89   Pulse: 79   Resp: 20   Temp: 97 9 °F (36 6 °C)   SpO2: 98%     General: Awake, Alert, Oriented x 3, Mood and affect appropriate  Respiratory: Respirations even and unlabored  Cardiovascular: Peripheral pulses intact; no edema  Musculoskeletal Exam:  Right sacroiliac pain    ASA Score:  2  Assessment:   1   Sacroiliitis (HCC)        Plan: (R) SIJ injection

## 2018-03-27 ENCOUNTER — TELEPHONE (OUTPATIENT)
Dept: BARIATRICS | Facility: CLINIC | Age: 33
End: 2018-03-27

## 2018-03-27 LAB
25(OH)D3 SERPL-MCNC: 33 NG/ML (ref 30–100)
ALBUMIN SERPL-MCNC: 4.2 G/DL (ref 3.6–5.1)
ALBUMIN/GLOB SERPL: 1.4 (CALC) (ref 1–2.5)
ALP SERPL-CCNC: 75 U/L (ref 33–115)
ALT SERPL-CCNC: 12 U/L (ref 6–29)
AST SERPL-CCNC: 13 U/L (ref 10–30)
BILIRUB SERPL-MCNC: 0.4 MG/DL (ref 0.2–1.2)
BUN SERPL-MCNC: 11 MG/DL (ref 7–25)
BUN/CREAT SERPL: NORMAL (CALC) (ref 6–22)
CALCIUM SERPL-MCNC: 9.6 MG/DL (ref 8.6–10.2)
CHLORIDE SERPL-SCNC: 101 MMOL/L (ref 98–110)
CO2 SERPL-SCNC: 29 MMOL/L (ref 20–31)
CREAT SERPL-MCNC: 0.62 MG/DL (ref 0.5–1.1)
GLOBULIN SER CALC-MCNC: 3.1 G/DL (CALC) (ref 1.9–3.7)
GLUCOSE SERPL-MCNC: 77 MG/DL (ref 65–99)
INSULIN SERPL-ACNC: 7.8 UIU/ML (ref 2–19.6)
METHYLMALONATE SERPL-SCNC: 101 NMOL/L (ref 87–318)
POTASSIUM SERPL-SCNC: 4.2 MMOL/L (ref 3.5–5.3)
PROT SERPL-MCNC: 7.3 G/DL (ref 6.1–8.1)
SL AMB EGFR AFRICAN AMERICAN: 139 ML/MIN/1.73M2
SL AMB EGFR NON AFRICAN AMERICAN: 120 ML/MIN/1.73M2
SODIUM SERPL-SCNC: 138 MMOL/L (ref 135–146)
TSH SERPL-ACNC: 1.17 MIU/L
VIT B12 SERPL-MCNC: 425 PG/ML (ref 200–1100)

## 2018-03-27 NOTE — TELEPHONE ENCOUNTER
----- Message from Mala Tobias MD sent at 3/27/2018  8:30 AM EDT -----  All labs within acceptable limits

## 2018-03-28 ENCOUNTER — PATIENT OUTREACH (OUTPATIENT)
Dept: BARIATRICS | Facility: CLINIC | Age: 33
End: 2018-03-28

## 2018-04-02 ENCOUNTER — TELEPHONE (OUTPATIENT)
Dept: PAIN MEDICINE | Facility: MEDICAL CENTER | Age: 33
End: 2018-04-02

## 2018-04-03 NOTE — TELEPHONE ENCOUNTER
Spoke with pt states that she has had 50% relief following her procedure  Explained to the pt that it could take up to 2weeks to get the full effect of the steroid  Pt verbalized understanding

## 2018-04-03 NOTE — TELEPHONE ENCOUNTER
--If pt calls back please schedule her with AO as per STACEY to review previous further--      RN attempted to reach pt regarding JE's response  VMMLOM on home/cell ph# with pt c/b office hours and location provided

## 2018-04-13 ENCOUNTER — OFFICE VISIT (OUTPATIENT)
Dept: BARIATRICS | Facility: CLINIC | Age: 33
End: 2018-04-13

## 2018-04-13 VITALS
SYSTOLIC BLOOD PRESSURE: 116 MMHG | BODY MASS INDEX: 32.23 KG/M2 | HEIGHT: 64 IN | WEIGHT: 188.8 LBS | DIASTOLIC BLOOD PRESSURE: 72 MMHG

## 2018-04-13 DIAGNOSIS — R63.5 ABNORMAL WEIGHT GAIN: Primary | ICD-10-CM

## 2018-04-13 PROCEDURE — WMBAR: Performed by: DIETITIAN, REGISTERED

## 2018-04-13 PROCEDURE — WMSOUP: Performed by: DIETITIAN, REGISTERED

## 2018-04-13 PROCEDURE — WMPUDDG: Performed by: DIETITIAN, REGISTERED

## 2018-04-13 PROCEDURE — WMBVRGE: Performed by: DIETITIAN, REGISTERED

## 2018-04-13 PROCEDURE — VLCD: Performed by: DIETITIAN, REGISTERED

## 2018-04-13 PROCEDURE — RECHECK: Performed by: DIETITIAN, REGISTERED

## 2018-04-13 NOTE — PROGRESS NOTES
Weight Management Medical Nutrition Assessment  Wilber Thomas presented for a [facility] follow-up session  Today's weight is 188 8#  She has lost 11# in the past 3 weeks  Tolerating meal replacements without difficulty  Consuming at least 80oz of water in addition to the the water used to mix replacements  Patient not experiencing constipation  Anthropometric Measurements  Start Weight (lbs): 199 1#  Current Weight (lbs): 188 8#  TBW % Change from start weight:6%  Ideal Body Weight (lbs):117 5#  Goal Weight (lbs):150#    Weight Loss History  Previous weight loss attempts: Self Created Diets (Portion Control, Healthy Food Choices, etc )    Food and Nutrition Related History  Meal Plan  Breakfast:Pudding    Snack:  Lunch:Shake  Snack: Bar  Dinner:Soup  Snack:      Beverages: water  Volume of beverage intake: 70-80oz     Weekends: Same  Cravings: none  Trouble area of day:none    Frequency of Eating out: irregularly  Food restrictions:none  Cooking: self   Food Shopping: self    Physical Activity Intake  Activity:none  Frequency:none  Physical limitations/barriers to exercise: none    Estimated Needs  Energy    [place] Energy Needs: BMR : 1548 calories    2# loss sedentary:  858 calories      Protein:64-80gm      (1 2-1 5g/kg IBW)  Fluid: 62oz     (35mL/kg IBW)    Nutrition Diagnosis  Yes; Overweight/obesity  related to Excess energy intake as evidenced by  BMI more than normative standard for age and sex (obesity-grade I 26-30  9)       Nutrition Intervention  Meal Plan  [facility] Diet    Nutrition Education:   [facility] Manual  Calorie controlled menu  Lean protein food choices  Healthy snack options  Food journaling tips      Nutrition Counseling:  Strategies: meal planning, portion sizes, healthy snack choices, hydration, fiber intake, protein intake, exercise, food journal      Monitoring and Evaluation:  Evaluation criteria:  Energy Intake:[facility] Diet : 760 calories / 43 net carbs using 3 meal replacements and 1 bar daily Meet protein needs  Maintain adequate hydration  Monitor weekly weight  Meal planning/preparation  Food journal   Portions at mealtimes and snacks  Physical activity     Barriers to learning:none  Readiness to change: Action  Comprehension: good  Expected Compliance: good

## 2018-04-22 ENCOUNTER — OFFICE VISIT (OUTPATIENT)
Dept: URGENT CARE | Facility: MEDICAL CENTER | Age: 33
End: 2018-04-22
Payer: COMMERCIAL

## 2018-04-22 VITALS
OXYGEN SATURATION: 98 % | BODY MASS INDEX: 33.31 KG/M2 | HEART RATE: 88 BPM | DIASTOLIC BLOOD PRESSURE: 64 MMHG | HEIGHT: 63 IN | RESPIRATION RATE: 20 BRPM | WEIGHT: 188 LBS | TEMPERATURE: 98.8 F | SYSTOLIC BLOOD PRESSURE: 112 MMHG

## 2018-04-22 DIAGNOSIS — H69.83 DYSFUNCTION OF BOTH EUSTACHIAN TUBES: Primary | ICD-10-CM

## 2018-04-22 PROCEDURE — 99212 OFFICE O/P EST SF 10 MIN: CPT | Performed by: PHYSICIAN ASSISTANT

## 2018-04-22 RX ORDER — PREDNISONE 10 MG/1
TABLET ORAL
Qty: 18 TABLET | Refills: 0 | Status: SHIPPED | OUTPATIENT
Start: 2018-04-22 | End: 2018-05-10 | Stop reason: ALTCHOICE

## 2018-04-22 RX ORDER — PREDNISONE 10 MG/1
TABLET ORAL
Qty: 18 TABLET | Refills: 0 | Status: SHIPPED | OUTPATIENT
Start: 2018-04-22 | End: 2018-04-22 | Stop reason: SDUPTHER

## 2018-04-22 NOTE — PATIENT INSTRUCTIONS
Eustachian Tube Dysfunction   WHAT YOU NEED TO KNOW:   What is eustachian tube dysfunction? Eustachian tube dysfunction (ETD) is a condition that prevents your eustachian tubes from opening properly  It can also cause them to become blocked  Eustachian tubes connect your middle ear to the back of your nose and throat  These tubes open and allow air to flow in and out when you sneeze, swallow, or yawn  What causes or increases my risk of ETD? ETD may be caused by swelling or buildup of mucus in your eustachian tubes  Allergies, a cold, or sinus infection can increase your risk for ETD  Smoking also increases your risk for ETD  What are the signs and symptoms of ETD? · Fullness or pressure in your ears    · Muffled hearing    · Pain in one or both ears    · Ringing in your ears    · Popping or clicking feeling in your ears    · Trouble keeping your balance  How is ETD diagnosed? Your healthcare provider will ask about your symptoms  He will examine your ears, your nose, and the back of your throat  He may also do a hearing test    How is ETD treated? Your ETD may get better on its own without any treatment  You may need any of the following:  · Exercises  such as swallowing, yawning, or chewing gum may help to open your eustachian tubes  Your healthcare provider may also recommend that you take a deep breath and then blow with your mouth shut and your nostrils pinched closed  · Air pressure devices  push air into your nose and eustachian tubes to help relieve air pressure in your ear  · Treatment for allergies  such as decongestants, antihistamines, and nasal steroids may improve ETD  They may help decrease swelling of the eustachian tubes  · Ear tubes  may help to keep your middle ear open  During this procedure, your healthcare provider will cut a small hole in your eardrum  · A myringotomy  is procedure to make a small cut in your eardrum and suction out fluid from your middle ear  · Tuboplasty  is a procedure to widen your eustachian tubes  When should I contact my healthcare provider? · Your symptoms do not improve or get worse  · You have a fever  · You have any hearing loss  · You have questions or concerns about your condition or care  CARE AGREEMENT:   You have the right to help plan your care  Learn about your health condition and how it may be treated  Discuss treatment options with your caregivers to decide what care you want to receive  You always have the right to refuse treatment  The above information is an  only  It is not intended as medical advice for individual conditions or treatments  Talk to your doctor, nurse or pharmacist before following any medical regimen to see if it is safe and effective for you  © 2017 [employer]  Information is for End User's use only and may not be sold, redistributed or otherwise used for commercial purposes  All illustrations and images included in CareNotes® are the copyrighted property of [employer]  or Bryce Han

## 2018-04-22 NOTE — PROGRESS NOTES
[facility] [employer] [facility]        NAME: Tiarra Juarez is a 33 y o  female  : 1985    MRN: [id number]  DATE: 2018  TIME: 9:28 AM    Assessment and Plan   Dysfunction of both eustachian tubes [H69 83]  1  Dysfunction of both eustachian tubes  predniSONE 10 mg tablet    DISCONTINUED: predniSONE 10 mg tablet         Patient Instructions       Follow up with PCP in 3-5 days  Proceed to  ER if symptoms worsen  Chief Complaint     Chief Complaint   Patient presents with    Earache     Pt with nasal congestion , ear pain , runny nose, low grade fever for 4 days   Headache    Nasal Congestion         History of Present Illness       Earache    There is pain in both ears  This is a new problem  The problem occurs constantly  The problem has been unchanged  There has been no fever  The pain is at a severity of 4/10  Associated symptoms include headaches and rhinorrhea  Pertinent negatives include no abdominal pain, coughing, diarrhea, ear discharge, hearing loss, neck pain, rash, sore throat or vomiting  Headache    Associated symptoms include ear pain and rhinorrhea  Pertinent negatives include no abdominal pain, coughing, hearing loss, neck pain, sore throat or vomiting  Patient with facial fullness, congestion, postnasal drip and the feeling that everything is stuck in her hand over the last several days  She is known Claritin for her seasonal allergies and taking over-the-counter cold medicines without relief  Review of Systems   Review of Systems   HENT: Positive for ear pain and rhinorrhea  Negative for ear discharge, hearing loss and sore throat  Respiratory: Negative for cough  Gastrointestinal: Negative for abdominal pain, diarrhea and vomiting  Musculoskeletal: Negative for neck pain  Skin: Negative for rash  Neurological: Positive for headaches  All other systems reviewed and are negative          Current Medications       Current Outpatient Prescriptions:    aspirin-acetaminophen-caffeine (EXCEDRIN MIGRAINE) 250-250-65 MG per tablet, Take 2 tablets by mouth daily, Disp: , Rfl:     cyclobenzaprine (FLEXERIL) 10 mg tablet, Take 1 tablet (10 mg total) by mouth daily at bedtime as needed for muscle spasms (or headache), Disp: 30 tablet, Rfl: 2    ergocalciferol (VITAMIN D2) 50,000 units, Take 1 capsule by mouth weekly, Disp: 4 capsule, Rfl: 0    gabapentin (NEURONTIN) 100 mg capsule, 1 tab qhs x 5 days, then 2 tabs qhs x 5 days, then 3 tabs  , Disp: 90 capsule, Rfl: 2    ibuprofen (MOTRIN) 400 mg tablet, Take 400 mg by mouth every 6 (six) hours as needed for mild pain, Disp: , Rfl:     Multiple Vitamin (MULTI-DAY) TABS, Take 1 tablet by mouth daily, Disp: , Rfl:     omeprazole (PriLOSEC) 20 mg delayed release capsule, TAKE ONE CAPSULE BY MOUTH EVERY DAY , Disp: 30 capsule, Rfl: 2    predniSONE 10 mg tablet, 4 x 3 days, 3 x 1, 2 x 1, 1 x 1, Disp: 18 tablet, Rfl: 0    Current Allergies     Allergies as of 04/22/2018    (No Known Allergies)            The following portions of the patient's history were reviewed and updated as appropriate: allergies, current medications, past family history, past medical history, past social history, past surgical history and problem list      Past Medical History:   Diagnosis Date    Abdominal pain     Anxiety     Asthma     Atypical face pain     Chronic constipation     Chronic midline low back pain     Diffuse pain     Fatigue     Headache, cervicogenic     Irritable bowel syndrome (IBS)     Low vitamin D level     Menstrual migraine     Myofacial muscle pain     Neck pain     NUD (nonulcer dyspepsia)     Obesity     Sore throat     Vertigo, benign paroxysmal     Viral syndrome     Vitamin B12 deficiency     Wrist pain, unspecified laterality        Past Surgical History:   Procedure Laterality Date    BACK SURGERY      ESOPHAGOGASTRODUODENOSCOPY      KNEE SURGERY      SHOULDER SURGERY         Family History Problem Relation Age of Onset    Hyperlipidemia Mother     Hyperlipidemia Father          Medications have been verified  Objective   /64 (BP Location: Left arm, Patient Position: Sitting, Cuff Size: Standard)   Pulse 88   Temp 98 8 °F (37 1 °C) (Tympanic)   Resp 20   Ht 5' 3" (1 6 m)   Wt 85 3 kg (188 lb)   SpO2 98%   BMI 33 30 kg/m²        Physical Exam     Physical Exam   Constitutional: She appears well-developed and well-nourished  HENT:   Head: Normocephalic and atraumatic  Right Ear: External ear normal    Left Ear: External ear normal    Mouth/Throat: Oropharynx is clear and moist    Cardiovascular: Normal rate and regular rhythm  Pulmonary/Chest: Effort normal and breath sounds normal    Nursing note and vitals reviewed  Tms bulging  Nasal mucosa boggy  Mild tenderness on palpation   Transillumination is clear

## 2018-05-10 ENCOUNTER — OFFICE VISIT (OUTPATIENT)
Dept: BARIATRICS | Facility: CLINIC | Age: 33
End: 2018-05-10
Payer: COMMERCIAL

## 2018-05-10 VITALS
TEMPERATURE: 98.8 F | WEIGHT: 189.6 LBS | BODY MASS INDEX: 32.37 KG/M2 | RESPIRATION RATE: 16 BRPM | SYSTOLIC BLOOD PRESSURE: 110 MMHG | HEART RATE: 80 BPM | HEIGHT: 64 IN | DIASTOLIC BLOOD PRESSURE: 60 MMHG

## 2018-05-10 DIAGNOSIS — G43.709 CHRONIC MIGRAINE WITHOUT AURA WITHOUT STATUS MIGRAINOSUS, NOT INTRACTABLE: ICD-10-CM

## 2018-05-10 DIAGNOSIS — E66.9 CLASS 1 OBESITY: ICD-10-CM

## 2018-05-10 DIAGNOSIS — E55.9 VITAMIN D DEFICIENCY: ICD-10-CM

## 2018-05-10 DIAGNOSIS — E53.8 VITAMIN B12 DEFICIENCY: ICD-10-CM

## 2018-05-10 DIAGNOSIS — R63.5 ABNORMAL WEIGHT GAIN: Primary | ICD-10-CM

## 2018-05-10 PROCEDURE — 99214 OFFICE O/P EST MOD 30 MIN: CPT | Performed by: INTERNAL MEDICINE

## 2018-05-10 RX ORDER — NORETHINDRONE ACETATE AND ETHINYL ESTRADIOL AND FERROUS FUMARATE 1MG-20(21)
KIT ORAL
COMMUNITY
Start: 2018-04-22 | End: 2020-12-24 | Stop reason: ALTCHOICE

## 2018-05-10 RX ORDER — RIZATRIPTAN BENZOATE 10 MG/1
TABLET ORAL
COMMUNITY
End: 2019-01-23

## 2018-05-10 NOTE — ASSESSMENT & PLAN NOTE
-Patient is pursuing Very Low Calorie Diet-VLCD  -Initial weight loss goal of 5-10% weight loss for improved health (met)  -Stopped VLCD on her own about a week ago  Would like to transition to a more balanced and sustainable plan  -Discussed the role of weight loss medications    Initial: 199 9  Current: 189  6  Change: -10 4lbs (5%)  Goal:

## 2018-05-10 NOTE — PATIENT INSTRUCTIONS
Goals: Food log (ie ) www myfitnesspal com,sparkpeople  com,loseit com,calorieking  com,etc    No sugary beverages  At least 64oz of water daily  Twice per week 20-30 minute exercise video   Gradually increase physical activity to a goal of 5 days per week for 30 minutes of MODERATE intensity PLUS 2 days per week of FULL BODY resistance training  Weekly weights  5347-3984 calories per day

## 2018-05-10 NOTE — PROGRESS NOTES
Assessment/Plan:    Class 1 obesity  -Patient is pursuing Very Low Calorie Diet-VLCD  -Initial weight loss goal of 5-10% weight loss for improved health (met)  -Stopped VLCD on her own about a week ago  Would like to transition to a more balanced and sustainable plan  -Discussed the role of weight loss medications    Initial: 199 9  Current: 189  6  Change: -10 4lbs (5%)  Goal:      Vitamin B12 deficiency  Repeat B12 and MMA WNL    Vitamin D deficiency  Repeat vit D level WNL    Chronic migraine without aura without status migrainosus, not intractable  Follows w/ neurology  On gabapentin  Had improved while on very low-calorie diet        Follow up in approximately 1 month with Non-Surgical Dietician and about 3 months with nonsurgical physician assistant  Diagnoses and all orders for this visit:    Abnormal weight gain    Class 1 obesity    Vitamin B12 deficiency    Vitamin D deficiency    Chronic migraine without aura without status migrainosus, not intractable    Other orders  -     MICROGESTIN FE 1/20 1-20 MG-MCG per tablet;   -     rizatriptan (MAXALT) 10 MG tablet; rizatriptan 10 mg tablet          Subjective:   Chief Complaint   Patient presents with    Follow-up     Patient here for [facility] follow up  Patient ID: Maurice Hernandez  is a 33 y o  female with excess weight/obesity here to pursue weight managment  Patient is pursuing Very Low Calorie Diet-VLCD  HPI    Patient has been doing well lost about 10 lb but over the last few weeks  Doing very low-calorie diet protocol  Patient reports that her abdominal symptoms have improved  Feels more satisfied with less food  Still feels fatigued but she reports that she does not sleep well    Patient also reports that headaches were also better while on very low-calorie diet    The following portions of the patient's history were reviewed and updated as appropriate: allergies, current medications, past family history, past medical history, past social history, past surgical history and problem list     Review of Systems    Objective:    /60 (BP Location: Left arm, Patient Position: Sitting, Cuff Size: Large)   Pulse 80   Temp 98 8 °F (37 1 °C) (Tympanic)   Resp 16   Ht 5' 3 5" (1 613 m)   Wt 86 kg (189 lb 9 6 oz)   BMI 33 06 kg/m²      Physical Exam   Nursing note and vitals reviewed  25 minute visit, >50% face-to-face time spent counseling patient on diet behavior and exercise modification for weight loss

## 2018-08-20 ENCOUNTER — TELEPHONE (OUTPATIENT)
Dept: NEUROLOGY | Facility: CLINIC | Age: 33
End: 2018-08-20

## 2018-09-16 ENCOUNTER — OFFICE VISIT (OUTPATIENT)
Dept: URGENT CARE | Age: 33
End: 2018-09-16
Payer: COMMERCIAL

## 2018-09-16 VITALS
HEIGHT: 64 IN | WEIGHT: 194 LBS | RESPIRATION RATE: 16 BRPM | OXYGEN SATURATION: 98 % | BODY MASS INDEX: 33.12 KG/M2 | DIASTOLIC BLOOD PRESSURE: 76 MMHG | HEART RATE: 93 BPM | SYSTOLIC BLOOD PRESSURE: 117 MMHG | TEMPERATURE: 98.5 F

## 2018-09-16 DIAGNOSIS — J01.00 ACUTE MAXILLARY SINUSITIS, RECURRENCE NOT SPECIFIED: Primary | ICD-10-CM

## 2018-09-16 PROCEDURE — 99213 OFFICE O/P EST LOW 20 MIN: CPT | Performed by: PHYSICIAN ASSISTANT

## 2018-09-16 RX ORDER — FLUTICASONE PROPIONATE 50 MCG
2 SPRAY, SUSPENSION (ML) NASAL DAILY
Qty: 16 G | Refills: 0 | Status: SHIPPED | OUTPATIENT
Start: 2018-09-16 | End: 2019-01-23

## 2018-09-16 RX ORDER — AMOXICILLIN AND CLAVULANATE POTASSIUM 875; 125 MG/1; MG/1
1 TABLET, FILM COATED ORAL EVERY 12 HOURS SCHEDULED
Qty: 14 TABLET | Refills: 0 | Status: SHIPPED | OUTPATIENT
Start: 2018-09-16 | End: 2018-09-23

## 2018-09-16 RX ORDER — ALBUTEROL SULFATE 90 UG/1
2 AEROSOL, METERED RESPIRATORY (INHALATION) EVERY 6 HOURS PRN
Qty: 1 INHALER | Refills: 0 | Status: SHIPPED | OUTPATIENT
Start: 2018-09-16

## 2018-09-16 RX ORDER — CETIRIZINE HYDROCHLORIDE 10 MG/1
10 TABLET ORAL DAILY
Qty: 30 TABLET | Refills: 0 | Status: SHIPPED | OUTPATIENT
Start: 2018-09-16 | End: 2019-01-23

## 2018-09-16 RX ORDER — BENZONATATE 100 MG/1
100 CAPSULE ORAL 3 TIMES DAILY PRN
Qty: 15 CAPSULE | Refills: 0 | Status: SHIPPED | OUTPATIENT
Start: 2018-09-16 | End: 2019-01-23

## 2018-09-16 NOTE — PROGRESS NOTES
[facility] [facility]        NAME: Janie Bolanos is a 29 y o  female  : 1985    MRN: [id number]  DATE: 2018  TIME: 11:49 AM    Assessment and Plan   Acute maxillary sinusitis, recurrence not specified [J01 00]  1  Acute maxillary sinusitis, recurrence not specified  amoxicillin-clavulanate (AUGMENTIN) 875-125 mg per tablet    fluticasone (FLONASE) 50 mcg/act nasal spray    cetirizine (ZyrTEC) 10 mg tablet    benzonatate (TESSALON PERLES) 100 mg capsule    albuterol (PROVENTIL HFA,VENTOLIN HFA) 90 mcg/act inhaler         Patient Instructions     Take medications as directed  Take antibiotic until completed  Continue to use at home medications  Motrin and/or Tylenol as needed for fevers or aches and pains  Follow up with PCP in 3-5 days  Proceed to  ER if symptoms worsen  Chief Complaint     Chief Complaint   Patient presents with    Sinusitis     sinus pressure 2 weeks; sore throat 3 days         History of Present Illness       80-year-old female presents with 2 week history of sinus pain congestion and recently sore throat for the past 3 days  Also has been having dry cough that has been worsening as well  No fevers chills nausea vomiting diarrhea  No chest pain or abdominal pain  Sinusitis   This is a new problem  The current episode started 1 to 4 weeks ago  The problem has been waxing and waning since onset  There has been no fever  The pain is moderate  Associated symptoms include congestion, coughing, headaches, sinus pressure and a sore throat  Pertinent negatives include no chills, diaphoresis, ear pain, neck pain, shortness of breath, sneezing or swollen glands  Past treatments include oral decongestants  The treatment provided no relief  Review of Systems   Review of Systems   Constitutional: Negative  Negative for chills and diaphoresis  HENT: Positive for congestion, sinus pressure and sore throat  Negative for ear pain and sneezing  Eyes: Negative  Respiratory: Positive for cough  Negative for shortness of breath  Cardiovascular: Negative  Gastrointestinal: Negative  Musculoskeletal: Negative  Negative for neck pain  Skin: Negative  Neurological: Positive for headaches  Current Medications       Current Outpatient Prescriptions:     cyclobenzaprine (FLEXERIL) 10 mg tablet, Take 1 tablet (10 mg total) by mouth daily at bedtime as needed for muscle spasms (or headache), Disp: 30 tablet, Rfl: 2    albuterol (PROVENTIL HFA,VENTOLIN HFA) 90 mcg/act inhaler, Inhale 2 puffs every 6 (six) hours as needed for wheezing, Disp: 1 Inhaler, Rfl: 0    amoxicillin-clavulanate (AUGMENTIN) 875-125 mg per tablet, Take 1 tablet by mouth every 12 (twelve) hours for 7 days, Disp: 14 tablet, Rfl: 0    aspirin-acetaminophen-caffeine (EXCEDRIN MIGRAINE) 250-250-65 MG per tablet, Take 2 tablets by mouth daily, Disp: , Rfl:     benzonatate (TESSALON PERLES) 100 mg capsule, Take 1 capsule (100 mg total) by mouth 3 (three) times a day as needed for cough, Disp: 15 capsule, Rfl: 0    cetirizine (ZyrTEC) 10 mg tablet, Take 1 tablet (10 mg total) by mouth daily, Disp: 30 tablet, Rfl: 0    fluticasone (FLONASE) 50 mcg/act nasal spray, 2 sprays into each nostril daily, Disp: 16 g, Rfl: 0    gabapentin (NEURONTIN) 100 mg capsule, 1 tab qhs x 5 days, then 2 tabs qhs x 5 days, then 3 tabs   (Patient not taking: Reported on 9/16/2018 ), Disp: 90 capsule, Rfl: 2    ibuprofen (MOTRIN) 400 mg tablet, Take 400 mg by mouth every 6 (six) hours as needed for mild pain, Disp: , Rfl:     MICROGESTIN FE 1/20 1-20 MG-MCG per tablet, , Disp: , Rfl:     Multiple Vitamin (MULTI-DAY) TABS, Take 1 tablet by mouth daily, Disp: , Rfl:     omeprazole (PriLOSEC) 20 mg delayed release capsule, TAKE ONE CAPSULE BY MOUTH EVERY DAY  (Patient not taking: Reported on 9/16/2018), Disp: 30 capsule, Rfl: 2    rizatriptan (MAXALT) 10 MG tablet, rizatriptan 10 mg tablet, Disp: , Rfl: Current Allergies     Allergies as of 09/16/2018    (No Known Allergies)            The following portions of the patient's history were reviewed and updated as appropriate: allergies, current medications, past family history, past medical history, past social history, past surgical history and problem list      Past Medical History:   Diagnosis Date    Abdominal pain     Anxiety     Asthma     Atypical face pain     Chronic constipation     Chronic midline low back pain     Diffuse pain     Fatigue     Headache, cervicogenic     Irritable bowel syndrome (IBS)     Low vitamin D level     Menstrual migraine     Myofacial muscle pain     Neck pain     NUD (nonulcer dyspepsia)     Obesity     Sore throat     Vertigo, benign paroxysmal     Viral syndrome     Vitamin B12 deficiency     Wrist pain, unspecified laterality        Past Surgical History:   Procedure Laterality Date    BACK SURGERY      ESOPHAGOGASTRODUODENOSCOPY      KNEE SURGERY      SHOULDER SURGERY         Family History   Problem Relation Age of Onset    Hyperlipidemia Mother     Hyperlipidemia Father          Medications have been verified  Objective   /76   Pulse 93   Temp 98 5 °F (36 9 °C)   Resp 16   Ht 5' 4" (1 626 m)   Wt 88 kg (194 lb)   SpO2 98%   BMI 33 30 kg/m²        Physical Exam     Physical Exam   Constitutional: She is oriented to person, place, and time  She appears well-developed and well-nourished  No distress  HENT:   Head: Normocephalic and atraumatic  Right Ear: External ear normal    Left Ear: External ear normal    Nose: Right sinus exhibits maxillary sinus tenderness  Left sinus exhibits maxillary sinus tenderness  Mouth/Throat: Posterior oropharyngeal erythema (Mild) present  No oropharyngeal exudate  Eyes: Conjunctivae are normal  Right eye exhibits no discharge  Left eye exhibits no discharge  Neck: Normal range of motion  Neck supple     Cardiovascular: Normal rate, regular rhythm, normal heart sounds and intact distal pulses  No murmur heard  Pulmonary/Chest: Effort normal and breath sounds normal  No respiratory distress  She has no wheezes  She has no rales  Abdominal: Soft  Bowel sounds are normal  There is no tenderness  Musculoskeletal: Normal range of motion  Lymphadenopathy:     She has no cervical adenopathy  Neurological: She is alert and oriented to person, place, and time  Skin: Skin is warm and dry  Psychiatric: She has a normal mood and affect  Nursing note and vitals reviewed

## 2018-09-16 NOTE — PATIENT INSTRUCTIONS
Take medications as directed  Take antibiotic until completed  Continue to use at home medications  Motrin and/or Tylenol as needed for fevers or aches and pains  Follow up with PCP in 3-5 days  Proceed to  ER if symptoms worsen    Sinusitis   AMBULATORY CARE:   Sinusitis  is inflammation or infection of your sinuses  It is most often caused by a virus  Acute sinusitis may last up to 12 weeks  Chronic sinusitis lasts longer than 12 weeks  Recurrent sinusitis means you have 4 or more times in 1 year  Common symptoms include the following:   · Fever    · Pain, pressure, redness, or swelling around the forehead, cheeks, or eyes    · Thick yellow or green discharge from your nose    · Tenderness when you touch your face over your sinuses    · Dry cough that happens mostly at night or when you lie down    · Headache and face pain that is worse when you lean forward    · Tooth pain, or pain when you chew  Seek care immediately if:   · Your eye and eyelid are red, swollen, and painful  · You cannot open your eye  · You have vision changes, such as double vision  · Your eyeball bulges out or you cannot move your eye  · You are more sleepy than normal, or you notice changes in your ability to think, move, or talk  · You have a stiff neck, a fever, or a bad headache  · You have swelling of your forehead or scalp  Contact your healthcare provider if:   · Your symptoms do not improve after 3 days  · Your symptoms do not go away after 10 days  · You have nausea and are vomiting  · Your nose is bleeding  · You have questions or concerns about your condition or care  Treatment for sinusitis:  Your symptoms may go away on their own  Your healthcare provider may recommend watchful waiting for up to 10 days before starting antibiotics  You may  need any of the following:  · Acetaminophen  decreases pain and fever  It is available without a doctor's order   Ask how much to take and how often to take it  Follow directions  Read the labels of all other medicines you are using to see if they also contain acetaminophen, or ask your doctor or pharmacist  Acetaminophen can cause liver damage if not taken correctly  Do not use more than 4 grams (4,000 milligrams) total of acetaminophen in one day  · NSAIDs , such as ibuprofen, help decrease swelling, pain, and fever  This medicine is available with or without a doctor's order  NSAIDs can cause stomach bleeding or kidney problems in certain people  If you take blood thinner medicine, always ask your healthcare provider if NSAIDs are safe for you  Always read the medicine label and follow directions  · Nasal steroid sprays  may help decrease inflammation in your nose and sinuses  · Decongestants  help reduce swelling and drain mucus in the nose and sinuses  They may help you breathe easier  · Antihistamines  help dry mucus in the nose and relieve sneezing  · Antibiotics  help treat or prevent a bacterial infection  · Take your medicine as directed  Contact your healthcare provider if you think your medicine is not helping or if you have side effects  Tell him or her if you are allergic to any medicine  Keep a list of the medicines, vitamins, and herbs you take  Include the amounts, and when and why you take them  Bring the list or the pill bottles to follow-up visits  Carry your medicine list with you in case of an emergency  Self-care:   · Rinse your sinuses  Use a sinus rinse device to rinse your nasal passages with a saline (salt water) solution or distilled water  Do not use tap water  This will help thin the mucus in your nose and rinse away pollen and dirt  It will also help reduce swelling so you can breathe normally  Ask your healthcare provider how often to do this  · Breathe in steam   Heat a bowl of water until you see steam  Lean over the bowl and make a tent over your head with a large towel   Breathe deeply for about 20 minutes  Be careful not to get too close to the steam or burn yourself  Do this 3 times a day  You can also breathe deeply when you take a hot shower  · Sleep with your head elevated  Place an extra pillow under your head before you go to sleep to help your sinuses drain  · Drink liquids as directed  Ask your healthcare provider how much liquid to drink each day and which liquids are best for you  Liquids will thin the mucus in your nose and help it drain  Avoid drinks that contain alcohol or caffeine  · Do not smoke, and avoid secondhand smoke  Nicotine and other chemicals in cigarettes and cigars can make your symptoms worse  Ask your healthcare provider for information if you currently smoke and need help to quit  E-cigarettes or smokeless tobacco still contain nicotine  Talk to your healthcare provider before you use these products  Prevent the spread of germs that cause sinusitis:  Wash your hands often with soap and water  Wash your hands after you use the bathroom, change a child's diaper, or sneeze  Wash your hands before you prepare or eat food  Follow up with your healthcare provider as directed: You may be referred to an ear, nose, and throat specialist  Write down your questions so you remember to ask them during your visits  © 2017 [employer]  Information is for End User's use only and may not be sold, redistributed or otherwise used for commercial purposes  All illustrations and images included in CareNotes® are the copyrighted property of [employer]  or [employer]ce [employer]  The above information is an  only  It is not intended as medical advice for individual conditions or treatments  Talk to your doctor, nurse or pharmacist before following any medical regimen to see if it is safe and effective for you

## 2018-10-16 DIAGNOSIS — E55.9 VITAMIN D DEFICIENCY: ICD-10-CM

## 2018-10-17 RX ORDER — ERGOCALCIFEROL 1.25 MG/1
CAPSULE ORAL
Qty: 4 CAPSULE | Refills: 0 | Status: SHIPPED | OUTPATIENT
Start: 2018-10-17 | End: 2019-05-24 | Stop reason: SDUPTHER

## 2018-12-19 ENCOUNTER — TRANSCRIBE ORDERS (OUTPATIENT)
Dept: ADMINISTRATIVE | Facility: HOSPITAL | Age: 33
End: 2018-12-19

## 2018-12-19 ENCOUNTER — APPOINTMENT (OUTPATIENT)
Dept: RADIOLOGY | Age: 33
End: 2018-12-19
Payer: COMMERCIAL

## 2018-12-19 DIAGNOSIS — G43.709 CHRONIC MIGRAINE WITHOUT AURA WITHOUT STATUS MIGRAINOSUS, NOT INTRACTABLE: ICD-10-CM

## 2018-12-19 DIAGNOSIS — M54.41 LOW BACK PAIN WITH RIGHT-SIDED SCIATICA, UNSPECIFIED BACK PAIN LATERALITY, UNSPECIFIED CHRONICITY: Primary | ICD-10-CM

## 2018-12-19 DIAGNOSIS — M54.41 ACUTE BACK PAIN WITH SCIATICA, RIGHT: ICD-10-CM

## 2018-12-19 DIAGNOSIS — M54.41 LOW BACK PAIN WITH RIGHT-SIDED SCIATICA, UNSPECIFIED BACK PAIN LATERALITY, UNSPECIFIED CHRONICITY: ICD-10-CM

## 2018-12-19 PROCEDURE — 72110 X-RAY EXAM L-2 SPINE 4/>VWS: CPT

## 2018-12-19 PROCEDURE — 72170 X-RAY EXAM OF PELVIS: CPT

## 2018-12-20 RX ORDER — CYCLOBENZAPRINE HCL 10 MG
10 TABLET ORAL
Qty: 30 TABLET | Refills: 0 | Status: SHIPPED | OUTPATIENT
Start: 2018-12-20 | End: 2019-07-12 | Stop reason: SDUPTHER

## 2018-12-20 NOTE — TELEPHONE ENCOUNTER
From: Erin Oglesby  Sent: 12/19/2018 8:51 PM EST  Subject: Medication Renewal Request    Erin Oglesby would like a refill of the following medications:     cyclobenzaprine (FLEXERIL) 10 mg tablet GUTIERREZ Mccabe    Preferred pharmacy: [id number]    Comment:

## 2018-12-27 ENCOUNTER — APPOINTMENT (OUTPATIENT)
Dept: RADIOLOGY | Age: 33
End: 2018-12-27
Payer: COMMERCIAL

## 2018-12-27 ENCOUNTER — TRANSCRIBE ORDERS (OUTPATIENT)
Dept: URGENT CARE | Age: 33
End: 2018-12-27

## 2018-12-27 DIAGNOSIS — M53.1 CERVICOBRACHIAL SYNDROME: ICD-10-CM

## 2018-12-27 DIAGNOSIS — M54.2 NECK PAIN: Primary | ICD-10-CM

## 2018-12-27 DIAGNOSIS — M54.2 NECK PAIN: ICD-10-CM

## 2018-12-27 PROCEDURE — 72050 X-RAY EXAM NECK SPINE 4/5VWS: CPT

## 2019-01-07 ENCOUNTER — TRANSCRIBE ORDERS (OUTPATIENT)
Dept: ADMINISTRATIVE | Facility: HOSPITAL | Age: 34
End: 2019-01-07

## 2019-01-07 DIAGNOSIS — M54.16 LUMBAR RADICULOPATHY: Primary | ICD-10-CM

## 2019-01-09 ENCOUNTER — HOSPITAL ENCOUNTER (OUTPATIENT)
Dept: MRI IMAGING | Facility: HOSPITAL | Age: 34
Discharge: HOME | End: 2019-01-09
Payer: COMMERCIAL

## 2019-01-09 DIAGNOSIS — M54.16 LUMBAR RADICULOPATHY: ICD-10-CM

## 2019-01-09 PROCEDURE — 72158 MRI LUMBAR SPINE W/O & W/DYE: CPT

## 2019-01-09 PROCEDURE — A9585 GADOBUTROL INJECTION: HCPCS | Performed by: PHYSICIAN ASSISTANT

## 2019-01-09 RX ADMIN — GADOBUTROL 8 ML: 604.72 INJECTION INTRAVENOUS at 16:41

## 2019-01-14 ENCOUNTER — TRANSCRIBE ORDERS (OUTPATIENT)
Dept: NEUROSURGERY | Facility: CLINIC | Age: 34
End: 2019-01-14

## 2019-01-14 DIAGNOSIS — E55.9 VITAMIN D DEFICIENCY: ICD-10-CM

## 2019-01-14 DIAGNOSIS — M54.50 LOWER BACK PAIN: Primary | ICD-10-CM

## 2019-01-15 RX ORDER — ERGOCALCIFEROL 1.25 MG/1
CAPSULE ORAL
Qty: 4 CAPSULE | Refills: 0 | OUTPATIENT
Start: 2019-01-15

## 2019-01-17 ENCOUNTER — TELEPHONE (OUTPATIENT)
Dept: NEUROSURGERY | Facility: CLINIC | Age: 34
End: 2019-01-17

## 2019-01-17 NOTE — TELEPHONE ENCOUNTER
Called patient to see if she would like to be seen sooner  I informed her that we could bring her in sooner with Dr Lester Rojas  Patient states that Dr Soils Drake was recommended to her so she will keep her current appt for now but will call me back if she changes her mind  I provided her with my direct number

## 2019-01-18 ENCOUNTER — TELEPHONE (OUTPATIENT)
Dept: NEUROSURGERY | Facility: CLINIC | Age: 34
End: 2019-01-18

## 2019-01-18 NOTE — TELEPHONE ENCOUNTER
Patient called the office stating that she would like to come in sooner to see Dr Karma Etienne  Patient scheduled for next Wednesday  She was appreciative  Reed notified

## 2019-01-23 ENCOUNTER — APPOINTMENT (OUTPATIENT)
Dept: RADIOLOGY | Age: 34
End: 2019-01-23
Payer: COMMERCIAL

## 2019-01-23 ENCOUNTER — OFFICE VISIT (OUTPATIENT)
Dept: NEUROSURGERY | Facility: CLINIC | Age: 34
End: 2019-01-23
Payer: COMMERCIAL

## 2019-01-23 VITALS
WEIGHT: 192 LBS | HEIGHT: 64 IN | BODY MASS INDEX: 32.78 KG/M2 | SYSTOLIC BLOOD PRESSURE: 106 MMHG | HEART RATE: 78 BPM | RESPIRATION RATE: 16 BRPM | DIASTOLIC BLOOD PRESSURE: 78 MMHG

## 2019-01-23 DIAGNOSIS — S32.009G: ICD-10-CM

## 2019-01-23 DIAGNOSIS — M54.50 LOWER BACK PAIN: ICD-10-CM

## 2019-01-23 DIAGNOSIS — M43.16 SPONDYLOLISTHESIS OF LUMBAR REGION: ICD-10-CM

## 2019-01-23 DIAGNOSIS — S32.009G: Primary | ICD-10-CM

## 2019-01-23 PROCEDURE — 72110 X-RAY EXAM L-2 SPINE 4/>VWS: CPT

## 2019-01-23 PROCEDURE — 99204 OFFICE O/P NEW MOD 45 MIN: CPT | Performed by: NEUROLOGICAL SURGERY

## 2019-01-23 NOTE — PROGRESS NOTES
Assessment/Plan:    No problem-specific Assessment & Plan notes found for this encounter  Problem List Items Addressed This Visit     None      Visit Diagnoses     Closed fracture of lumbar vertebra without spinal cord injury with delayed healing    -  Primary    Relevant Orders    Ambulatory referral to Pain Management    Ambulatory referral to Physical Therapy    XR spine lumbar complete w bending minimum 6 views    CT lumbar spine without contrast    Lower back pain        Relevant Orders    Ambulatory referral to Pain Management    Ambulatory referral to Physical Therapy    XR spine lumbar complete w bending minimum 6 views    CT lumbar spine without contrast    Spondylolisthesis of lumbar region        Relevant Orders    Ambulatory referral to Pain Management    Ambulatory referral to Physical Therapy    XR spine lumbar complete w bending minimum 6 views    CT lumbar spine without contrast            Subjective:      Patient ID: Devi Fragoso is a 29 y o  female  HPI    The following portions of the patient's history were reviewed and updated as appropriate: allergies, current medications, past family history, past medical history, past surgical history and problem list     Review of Systems   Constitutional: Positive for fatigue  Negative for chills and fever  HENT: Negative  Eyes: Negative for pain and visual disturbance  Respiratory: Negative for cough, shortness of breath and wheezing  Cardiovascular: Negative for chest pain and palpitations  Gastrointestinal: Negative for abdominal pain and nausea  Genitourinary: Negative for difficulty urinating  Musculoskeletal: Positive for arthralgias and back pain  Negative for gait problem, neck pain and neck stiffness  Neurological: Positive for numbness and headaches  Negative for dizziness, speech difficulty and weakness           Objective:      /78 (BP Location: Left arm, Patient Position: Sitting, Cuff Size: Standard) Pulse 78   Resp 16   Ht 5' 4" (1 626 m)   Wt 87 1 kg (192 lb)   BMI 32 96 kg/m²          Physical Exam      HPI    Hx of bilateral L5 pars fracture with TP/SP cerclage wiring performed at [facility] 2002  Progressive bilateral lower back pain with buttock radiation, worse with activity/position change x 2 yrs  Nsaids, muscle relaxant, SI joint injection of limited benefit  Denies weakness, numbness, loss of fine motor control, bowel or bladder deficit  Exam    Full strength bilateral LE  Normal sensation and DTR  Negative SLR  Well healed incision  Paravertebral spasm  Mildly stooped gait  Able to complete tandem  Normal heel and toe walk  Moderate restriction in lumbar ROM    Radiology    nonhealed L5 pars fracture with bilateral TP/SP cerclage wiring w broken wire in the setting of grade 1 L5/S1 anterolisthesis    Widely patent central canal with no significant central stenosis, moderate L5/S1 disc degeneration    Summary and Plan    Ms Joanna Renae has nonhealed L5 pars fractures with grade 1 spondylolisthesis in the settling of previous cerlage wiring  Today we reviewed the conservative medical options including medications, PT and CHRISTI  I encouraged her to use nsaids and muscle relaxants on a PRN basis, to increase her activity level, provided her with a script for PT and asked her to return to Dr Graham Sher for a diagnostic L5 pars/facet block  I have also ordered flex/ext xray as well as lumbar CT to better assess the extent of her chronic injury  I will see her in fu in 6 weeks

## 2019-01-24 DIAGNOSIS — E55.9 VITAMIN D DEFICIENCY: ICD-10-CM

## 2019-01-24 RX ORDER — ERGOCALCIFEROL 1.25 MG/1
CAPSULE ORAL
Qty: 4 CAPSULE | Refills: 0 | OUTPATIENT
Start: 2019-01-24

## 2019-01-25 ENCOUNTER — TRANSCRIBE ORDERS (OUTPATIENT)
Dept: ADMINISTRATIVE | Age: 34
End: 2019-01-25

## 2019-01-25 ENCOUNTER — APPOINTMENT (OUTPATIENT)
Dept: LAB | Age: 34
End: 2019-01-25
Payer: COMMERCIAL

## 2019-01-25 DIAGNOSIS — J30.1 NON-SEASONAL ALLERGIC RHINITIS DUE TO POLLEN: ICD-10-CM

## 2019-01-25 DIAGNOSIS — J30.1 NON-SEASONAL ALLERGIC RHINITIS DUE TO POLLEN: Primary | ICD-10-CM

## 2019-01-25 PROCEDURE — 86003 ALLG SPEC IGE CRUDE XTRC EA: CPT

## 2019-01-25 PROCEDURE — 86316 IMMUNOASSAY TUMOR OTHER: CPT

## 2019-01-25 PROCEDURE — 86008 ALLG SPEC IGE RECOMB EA: CPT

## 2019-01-25 PROCEDURE — 82785 ASSAY OF IGE: CPT

## 2019-01-25 PROCEDURE — 83519 RIA NONANTIBODY: CPT

## 2019-01-25 PROCEDURE — 83835 ASSAY OF METANEPHRINES: CPT

## 2019-01-25 PROCEDURE — 36415 COLL VENOUS BLD VENIPUNCTURE: CPT

## 2019-01-27 ENCOUNTER — TRANSCRIBE ORDERS (OUTPATIENT)
Dept: ADMINISTRATIVE | Age: 34
End: 2019-01-27

## 2019-01-27 ENCOUNTER — APPOINTMENT (OUTPATIENT)
Dept: LAB | Age: 34
End: 2019-01-27
Payer: COMMERCIAL

## 2019-01-27 DIAGNOSIS — J30.89 NON-SEASONAL ALLERGIC RHINITIS DUE TO OTHER ALLERGIC TRIGGER: Primary | ICD-10-CM

## 2019-01-27 DIAGNOSIS — J30.89 NON-SEASONAL ALLERGIC RHINITIS DUE TO OTHER ALLERGIC TRIGGER: ICD-10-CM

## 2019-01-27 LAB
ALLERGEN COMMENT: ABNORMAL
ALMOND IGE QN: 0.16 KUA/I
BRAZIL NUT IGE QN: 0.13 KUA/I
CASHEW NUT IGE QN: <0.1 KUA/I
HAZELNUT IGE QN: 0.3 KUA/L
PEANUT (RARA H) 1 IGE QN: <0.1 KUA/I
PEANUT (RARA H) 2 IGE QN: <0.1 KUA/I
PEANUT (RARA H) 3 IGE QN: <0.1 KUA/I
PEANUT (RARA H) 8 IGE QN: <0.1 KUA/I
PEANUT (RARA H) 9 IGE QN: <0.1 KUA/I
PEANUT IGE QN: 1.47 KUA/I
PECAN/HICK NUT IGE QN: <0.1 KUA/I
PISTACHIO IGE QN: 0.3 KUA/I
TOTAL IGE SMQN RAST: 154 KU/L (ref 0–113)
WALNUT IGE QN: 0.43 KUA/I

## 2019-01-27 PROCEDURE — 82384 ASSAY THREE CATECHOLAMINES: CPT

## 2019-01-27 PROCEDURE — 83835 ASSAY OF METANEPHRINES: CPT

## 2019-01-27 PROCEDURE — 83497 ASSAY OF 5-HIAA: CPT

## 2019-01-28 ENCOUNTER — OFFICE VISIT (OUTPATIENT)
Dept: PAIN MEDICINE | Facility: MEDICAL CENTER | Age: 34
End: 2019-01-28
Payer: COMMERCIAL

## 2019-01-28 VITALS
DIASTOLIC BLOOD PRESSURE: 88 MMHG | HEART RATE: 72 BPM | HEIGHT: 64 IN | WEIGHT: 196.8 LBS | RESPIRATION RATE: 14 BRPM | BODY MASS INDEX: 33.6 KG/M2 | SYSTOLIC BLOOD PRESSURE: 124 MMHG

## 2019-01-28 DIAGNOSIS — G89.29 CHRONIC RIGHT-SIDED LOW BACK PAIN WITH RIGHT-SIDED SCIATICA: ICD-10-CM

## 2019-01-28 DIAGNOSIS — M54.41 CHRONIC RIGHT-SIDED LOW BACK PAIN WITH RIGHT-SIDED SCIATICA: ICD-10-CM

## 2019-01-28 DIAGNOSIS — M43.17 SPONDYLOLISTHESIS AT L5-S1 LEVEL: Primary | ICD-10-CM

## 2019-01-28 LAB — CGA SERPL-SCNC: <1 NMOL/L (ref 0–5)

## 2019-01-28 PROCEDURE — 99214 OFFICE O/P EST MOD 30 MIN: CPT | Performed by: NURSE PRACTITIONER

## 2019-01-28 RX ORDER — TRAMADOL HYDROCHLORIDE 50 MG/1
TABLET ORAL
COMMUNITY
Start: 2019-01-08 | End: 2020-12-24 | Stop reason: ALTCHOICE

## 2019-01-28 RX ORDER — GABAPENTIN 300 MG/1
300 CAPSULE ORAL
Qty: 30 CAPSULE | Refills: 0 | Status: SHIPPED | OUTPATIENT
Start: 2019-01-28 | End: 2019-03-01 | Stop reason: SDUPTHER

## 2019-01-28 NOTE — PROGRESS NOTES
Assessment:  1  Spondylolisthesis at L5-S1 level    2  Chronic right-sided low back pain with right-sided sciatica        Plan: At this time I will initiate gabapentin 300 mg 1 tablet at bedtime to try and decrease some of her right radiating leg pain  Patient was educated on the most common side effects which are drowsiness, dizziness, blurry vision, and swelling of the hands and feet  Patient will be scheduled for a diagnostic L5 pars/facet block per Dr Pauline Hanks request to decrease any inflammatory components of her pain symptoms  Follow up in 4 weeks to reevaluate gabapentin  [place] Prescription Drug Monitoring Program report was reviewed and was appropriate     Complete risks and benefits including bleeding, infection, tissue reaction, nerve injury and allergic reaction were discussed  The approach was demonstrated using models and literature was provided  Verbal and written consent was obtained  My impressions and treatment recommendations were discussed in detail with the patient who verbalized understanding and had no further questions  Discharge instructions were provided  I personally saw and examined the patient and I agree with the above discussed plan of care  Orders Placed This Encounter   Procedures    FL spine and pain procedure     Standing Status:   Future     Standing Expiration Date:   2023     Order Specific Question:   Reason for Exam:     Answer:   diagnostic L5 pars/facet block     Order Specific Question:   Is the patient pregnant? Answer:   Unknown     Order Specific Question:   Anticoagulant hold needed?      Answer:   none     New Medications Ordered This Visit   Medications    traMADol (ULTRAM) 50 mg tablet    levonorgestrel (MIRENA, 52 MG,) 20 MCG/24HR IUD     Si each by Intrauterine route once    gabapentin (NEURONTIN) 300 mg capsule     Sig: Take 1 capsule (300 mg total) by mouth daily at bedtime     Dispense:  30 capsule     Refill:  0 History of Present Illness:    Cedric Gordillo is a 29 y o  female who presents to the office after being referred by Dr Lida Sanders for a diagnostic L5 pars/facet block  The patient did have bilateral L5 pars fracture with TP/SP cerclage wiring which was performed at [facility] in 2002  She currently has a nonhealed L5 pars fracture with TP/SP cerclage wiring with broken wire, and grade 1 L5-S1 anterolisthesis  The patient rates her pain level 5/10 this is constant in nature and bothersome throughout the entirety of the day  She describes her pain as dull, aching, sharp, shooting, and pins and needles to her right low back and her entire right leg  Patient will begin physical therapy on Thursday  I have personally reviewed and/or updated the patient's past medical history, past surgical history, family history, social history, current medications, allergies, and vital signs today  Review of Systems:    Review of Systems   Respiratory: Negative for shortness of breath  Cardiovascular: Negative for chest pain  Gastrointestinal: Positive for constipation  Negative for diarrhea, nausea and vomiting  Musculoskeletal: Positive for gait problem  Negative for arthralgias, joint swelling and myalgias  Skin: Negative for rash  Neurological: Negative for dizziness, seizures and weakness  All other systems reviewed and are negative        Patient Active Problem List   Diagnosis    Class 1 obesity    Vitamin D deficiency    Chronic migraine without aura without status migrainosus, not intractable    Vitamin B12 deficiency    Sacroiliitis (HCC)    Spondylolisthesis at L5-S1 level    Chronic right-sided low back pain with right-sided sciatica       Past Medical History:   Diagnosis Date    Abdominal pain     Anxiety     Asthma     Atypical face pain     Chronic constipation     Chronic midline low back pain     Diffuse pain     Fatigue     Headache, cervicogenic     Irritable bowel syndrome (IBS)     Low vitamin D level     Menstrual migraine     Myofacial muscle pain     Neck pain     NUD (nonulcer dyspepsia)     Obesity     Sore throat     Vertigo, benign paroxysmal     Viral syndrome     Vitamin B12 deficiency     Wrist pain, unspecified laterality        Past Surgical History:   Procedure Laterality Date    BACK SURGERY      ESOPHAGOGASTRODUODENOSCOPY      KNEE SURGERY      SHOULDER SURGERY         Family History   Problem Relation Age of Onset    Hyperlipidemia Mother     Hyperlipidemia Father        Social History     Occupational History    Not on file  Social History Main Topics    Smoking status: Never Smoker    Smokeless tobacco: Never Used    Alcohol use Yes      Comment: socially    Drug use: No    Sexual activity: Yes     Birth control/ protection: Pill       Current Outpatient Prescriptions on File Prior to Visit   Medication Sig    albuterol (PROVENTIL HFA,VENTOLIN HFA) 90 mcg/act inhaler Inhale 2 puffs every 6 (six) hours as needed for wheezing    aspirin-acetaminophen-caffeine (EXCEDRIN MIGRAINE) 250-250-65 MG per tablet Take 2 tablets by mouth daily    cyclobenzaprine (FLEXERIL) 10 mg tablet Take 1 tablet (10 mg total) by mouth daily at bedtime as needed for muscle spasms (or headache)    ergocalciferol (VITAMIN D2) 50,000 units TAKE ONE CAPSULE BY MOUTH WEEKLY     ibuprofen (MOTRIN) 400 mg tablet Take 400 mg by mouth every 6 (six) hours as needed for mild pain    Multiple Vitamin (MULTI-DAY) TABS Take 1 tablet by mouth daily    omeprazole (PriLOSEC) 20 mg delayed release capsule TAKE ONE CAPSULE BY MOUTH EVERY DAY     MICROGESTIN FE 1/20 1-20 MG-MCG per tablet      No current facility-administered medications on file prior to visit  Allergies   Allergen Reactions    Gadolinium Hives     Patient had redness over chest and arms minimally itchy  Patient refused to go the ED redness started to resolve after several minutes   LOT #[id number] gadavist       Physical Exam:    /88   Pulse 72   Resp 14   Ht 5' 4" (1 626 m)   Wt 89 3 kg (196 lb 12 8 oz)   BMI 33 78 kg/m²     Constitutional: normal, well developed, well nourished, alert, in no distress and non-toxic and no overt pain behavior  Eyes: anicteric  HEENT: grossly intact  Neck: supple, symmetric, trachea midline and no masses   Pulmonary:even and unlabored  Cardiovascular:No edema or pitting edema present  Skin:Normal without rashes or lesions and well hydrated  Psychiatric:Mood and affect appropriate  Neurologic:Cranial Nerves II-XII grossly intact  Musculoskeletal:normal   Lumbar Spine Exam    Appearance:  Normal lordosis  Palpation/Tenderness:  right lumbar paraspinal tenderness      Range of Motion:  Flexion:  Minimally limited  with pain  Extension:  Minimally limited  with pain  Lateral Flexion - Left:  No limitation  with pain  Lateral Flexion - Right:  No limitation  with pain  Rotation - Left:  No limitation  with pain  Rotation - Right:  No limitation  with pain  Motor Strength:  Left hip flexion:  5/5  Left hip extension:  5/5  Right hip flexion:  5/5  Right hip extension:  5/5  Left knee flexion:  5/5  Left knee extension:  5/5  Right knee flexion:  5/5  Right knee extension:  5/5  Left foot dorsiflexion:  5/5  Left foot plantar flexion:  5/5  Right foot dorsiflexion:  5/5  Right foot plantar flexion:  5/5      Special Tests:   Negative slump test bilaterally    Imaging  M54 16   Dx: Lumbar radiculopathy [M54 16 (ICD-10-CM)]   Study Result     MRI LUMBAR SPINE WITH AND WITHOUT CONTRAST     INDICATION: M54 16: Radiculopathy, lumbar region  New onset low back pain radiating down the right leg progressively getting worse over the last 2 years      COMPARISON:  Prior MRI dated July 28, 2017     TECHNIQUE:  Sagittal T1, sagittal T2, sagittal inversion recovery, axial T1 and axial T2, coronal T2    Sagittal and axial T1 postcontrast      IV Contrast:  8 mL of gadobutrol injection (MULTI-DOSE)      IMAGE QUALITY:  Diagnostic     FINDINGS:     VERTEBRAL BODIES:  Minimal L5-S1 anterolisthesis noted  Patient is status post L5-S1 surgery  Normal marrow signal is identified within the visualized bony structures  No discrete marrow lesion      SACRUM:  Normal signal within the sacrum  No evidence of insufficiency or stress fracture      DISTAL CORD AND CONUS:  Normal size and signal within the distal cord and conus        PARASPINAL SOFT TISSUES:  Paraspinal soft tissues are unremarkable      LOWER THORACIC DISC SPACES:  Normal disc height and signal   No disc herniation, canal stenosis or foraminal narrowing      LUMBAR DISC SPACES:  No central or foraminal narrowing  No abnormal postcontrast enhancement      POSTCONTRAST IMAGING:  No abnormal enhancement      IMPRESSION:     Minimal L5-S1 anterolisthesis  Evidence of prior surgery at L5-S1  No central or foraminal narrowing    No abnormal postcontrast enhancement         Workstation performed: [id number]

## 2019-01-30 LAB
METANEPH FREE SERPL-MCNC: 19 PG/ML (ref 0–62)
NORMETANEPHRINE SERPL-MCNC: 56 PG/ML (ref 0–145)

## 2019-01-31 ENCOUNTER — EVALUATION (OUTPATIENT)
Dept: PHYSICAL THERAPY | Facility: MEDICAL CENTER | Age: 34
End: 2019-01-31
Payer: COMMERCIAL

## 2019-01-31 DIAGNOSIS — G89.29 CHRONIC BILATERAL LOW BACK PAIN WITH RIGHT-SIDED SCIATICA: ICD-10-CM

## 2019-01-31 DIAGNOSIS — M54.41 CHRONIC BILATERAL LOW BACK PAIN WITH RIGHT-SIDED SCIATICA: ICD-10-CM

## 2019-01-31 DIAGNOSIS — M54.50 LOWER BACK PAIN: ICD-10-CM

## 2019-01-31 DIAGNOSIS — M43.16 SPONDYLOLISTHESIS OF LUMBAR REGION: ICD-10-CM

## 2019-01-31 DIAGNOSIS — S32.009G: ICD-10-CM

## 2019-01-31 LAB — NSE SERPL IA-MCNC: 3.7 NG/ML (ref 0–12.5)

## 2019-01-31 PROCEDURE — 97161 PT EVAL LOW COMPLEX 20 MIN: CPT | Performed by: PHYSICAL THERAPIST

## 2019-01-31 NOTE — PROGRESS NOTES
PT Evaluation     Today's date: 2019  Patient name: Aziza Taveras  : 1985  MRN: [id number]  Referring provider: Marcel Sheets MD  Dx:   Encounter Diagnosis     ICD-10-CM    1  Lower back pain M54 5 Ambulatory referral to Physical Therapy     PT plan of care cert/re-cert   2  Closed fracture of lumbar vertebra without spinal cord injury with delayed healing [id number] Ambulatory referral to Physical Therapy     PT plan of care cert/re-cert   3  Spondylolisthesis of lumbar region M43 16 Ambulatory referral to Physical Therapy     PT plan of care cert/re-cert                  Assessment/Plan  Patient presents with symptoms consistent with lumbar instability and chronic lower back pain and right sided radiculopathy  Patient demonstrates poor neuromuscular coordination  Patient is being further worked up at this time by neuro surge and pain and spine  Patient has come to [facility]PP[facility] to address issues of pain and difficulty with sleeping as well as function  Patient will benefit from skilled PT services to address issues of pain and return to normal function  2x a week for 4-6 weeks  Subjective  Patient states that she has been having lower back pain for 2 years  She notes that she had a failed spondy correction 17 years ago  Currently she is having lower back pain and right sided radiculopathy which is preventing her from sleeping at night  Patient is frustrated with her pain and would like some answers about her pain  Patient has had MRI and 4 way xray both showing broken wiring from previous surgery  CT scan is scheduled as well  Patient notes that abducting her hip and pronating her foot gives her some relief from her back and leg pain  Objective  Red Flags: none  Pain: 8/10  Reflexes: slightly diminished on left with patellar and achilles  Sensation is decreased through L5 dermatome     Posture: loss of lumbar lordosis, patient is overweight  AROM: WFL  PROM: WFL  PAROM: unable to test due to pain  Palpation: severe pain with light touch of lower back and of musculature of lumbar spine  Special Tests: 6/9 beighton scale, + dural sign, + aberrant motion, + spring test, + prone instability test  Coordination of Movement/strengthe: Patient demonstrates poor activation of Transversus Abdominus and multifidus force couple and loss of feed forward mechanism with reaching tasks  Patient was able to perform activation with cueing  Poor activation and stabilization of hip musculature  Goals:  - Pt I with initial HEP in 1-2 visits  - improved stabilizing structure activation and improved feed forward mechanism with distal activation  - Increase Functional Status Measure measured by FOTO by [facility]  - patient able to sleep through the night without waking from pain  - Return to ADL's as desired                Precautions: prior and failed L5 spondy correction

## 2019-02-01 LAB
5OH-INDOLEACETATE 24H UR-MCNC: 1.6 MG/L
5OH-INDOLEACETATE 24H UR-MRATE: 2.5 MG/24 HR (ref 0–14.9)

## 2019-02-02 LAB
METANEPH 24H UR-MRATE: 126 UG/24 HR (ref 45–290)
METANEPHS 24H UR-MCNC: 81 UG/L
NORMETANEPHRINE 24H UR-MCNC: 154 UG/L
NORMETANEPHRINE 24H UR-MRATE: 239 UG/24 HR (ref 82–500)

## 2019-02-04 LAB
DOPAMINE 24H UR-MRATE: 270 UG/24 HR (ref 0–510)
DOPAMINE UR-MCNC: 174 UG/L
EPINEPH 24H UR-MRATE: 3 UG/24 HR (ref 0–20)
EPINEPH UR-MCNC: 2 UG/L
NOREPINEPH 24H UR-MRATE: 31 UG/24 HR (ref 0–135)
NOREPINEPH UR-MCNC: 20 UG/L

## 2019-02-13 ENCOUNTER — OFFICE VISIT (OUTPATIENT)
Dept: PHYSICAL THERAPY | Facility: MEDICAL CENTER | Age: 34
End: 2019-02-13
Payer: COMMERCIAL

## 2019-02-13 DIAGNOSIS — K21.00 GASTROESOPHAGEAL REFLUX DISEASE WITH ESOPHAGITIS: ICD-10-CM

## 2019-02-13 DIAGNOSIS — G89.29 CHRONIC MIDLINE LOW BACK PAIN WITH BILATERAL SCIATICA: Primary | ICD-10-CM

## 2019-02-13 DIAGNOSIS — S32.009G: ICD-10-CM

## 2019-02-13 DIAGNOSIS — M54.42 CHRONIC MIDLINE LOW BACK PAIN WITH BILATERAL SCIATICA: Primary | ICD-10-CM

## 2019-02-13 DIAGNOSIS — M54.41 CHRONIC MIDLINE LOW BACK PAIN WITH BILATERAL SCIATICA: Primary | ICD-10-CM

## 2019-02-13 DIAGNOSIS — M43.16 SPONDYLOLISTHESIS OF LUMBAR REGION: ICD-10-CM

## 2019-02-13 PROCEDURE — 97110 THERAPEUTIC EXERCISES: CPT | Performed by: PHYSICAL THERAPIST

## 2019-02-14 RX ORDER — OMEPRAZOLE 20 MG/1
CAPSULE, DELAYED RELEASE ORAL
Qty: 30 CAPSULE | Refills: 1 | Status: SHIPPED | OUTPATIENT
Start: 2019-02-14

## 2019-02-18 ENCOUNTER — HOSPITAL ENCOUNTER (OUTPATIENT)
Dept: CT IMAGING | Facility: HOSPITAL | Age: 34
Discharge: HOME | End: 2019-02-18
Attending: NEUROLOGICAL SURGERY
Payer: COMMERCIAL

## 2019-02-18 DIAGNOSIS — M43.16 SPONDYLOLISTHESIS OF LUMBAR REGION: ICD-10-CM

## 2019-02-18 DIAGNOSIS — M54.50 LOWER BACK PAIN: ICD-10-CM

## 2019-02-18 DIAGNOSIS — S32.009G: ICD-10-CM

## 2019-02-18 PROCEDURE — 72131 CT LUMBAR SPINE W/O DYE: CPT

## 2019-02-20 ENCOUNTER — APPOINTMENT (OUTPATIENT)
Dept: PHYSICAL THERAPY | Facility: MEDICAL CENTER | Age: 34
End: 2019-02-20
Payer: COMMERCIAL

## 2019-02-23 DIAGNOSIS — E55.9 VITAMIN D DEFICIENCY: ICD-10-CM

## 2019-02-25 RX ORDER — ERGOCALCIFEROL 1.25 MG/1
CAPSULE ORAL
Qty: 4 CAPSULE | Refills: 0 | OUTPATIENT
Start: 2019-02-25

## 2019-02-27 ENCOUNTER — APPOINTMENT (OUTPATIENT)
Dept: PHYSICAL THERAPY | Facility: MEDICAL CENTER | Age: 34
End: 2019-02-27
Payer: COMMERCIAL

## 2019-02-28 ENCOUNTER — TELEPHONE (OUTPATIENT)
Dept: RADIOLOGY | Facility: MEDICAL CENTER | Age: 34
End: 2019-02-28

## 2019-02-28 DIAGNOSIS — M54.41 CHRONIC RIGHT-SIDED LOW BACK PAIN WITH RIGHT-SIDED SCIATICA: ICD-10-CM

## 2019-02-28 DIAGNOSIS — M43.17 SPONDYLOLISTHESIS AT L5-S1 LEVEL: ICD-10-CM

## 2019-02-28 DIAGNOSIS — G89.29 CHRONIC RIGHT-SIDED LOW BACK PAIN WITH RIGHT-SIDED SCIATICA: ICD-10-CM

## 2019-02-28 NOTE — TELEPHONE ENCOUNTER
Rec'd cold transfer voicemail message from patient  Scheduled for AL L4, L5 MBB #1 on 3/1  States that might have a sinus infx; she has not seen or called her family physician yet about it  Please call to further assess       [phone]

## 2019-02-28 NOTE — TELEPHONE ENCOUNTER
RN s/w pt regarding previous  Per pt she has had a 99F temp for three days and feels stuffy in her head and nose  Pt aware that if when she sees her physician tonight and they determine that she does have an infection, she will have to call and cx her appt for her MBB tomorrow  Pt aware that if they tell her it is not an infection that she can come and have the procedure as planned  Pt concerned because she has an appt with Dr Héctor Leos on 3/6 and he wanted her to have the MBB done prior to seeing him  Pt aware to contact Dr Vickie Villalta office as well if cx tomorrow's procedure      Pt aware that there will be no penalty or fees applied to her if she calls to cx     --pt appreciative of call and will f/u with us tomorrow--

## 2019-02-28 NOTE — TELEPHONE ENCOUNTER
RN s/w  about previousXavier give pt her direct line so that she can leave a message for May if she is unable to make the appt tomorrow      RN called back and s/w pt and gave ph# of [phone]

## 2019-03-01 ENCOUNTER — HOSPITAL ENCOUNTER (OUTPATIENT)
Dept: RADIOLOGY | Facility: MEDICAL CENTER | Age: 34
Discharge: HOME | End: 2019-03-01

## 2019-03-01 RX ORDER — GABAPENTIN 300 MG/1
300 CAPSULE ORAL
Qty: 30 CAPSULE | Refills: 0 | Status: SHIPPED | OUTPATIENT
Start: 2019-03-01 | End: 2019-10-09 | Stop reason: SDUPTHER

## 2019-03-01 NOTE — TELEPHONE ENCOUNTER
S/w pt, she was rescheduled for her MBB because she got started on an abx, she asked about resuming her Gabapentin  She was started on it by Matthew Cuadra, 300 mg at [facility], she said it took 2 weeks to start working but helped her R leg N/T feeling  She said it did not help her back pain she is having  Pt stopped the Gabapentin a week ago in anticipation of having the MBB, she said she wanted to see if the MBB worked on its own and was worried the Gabapentin would interfere with her relief  RN explained that the MBB would not help the N/T  Pt is wondering if she should go back on it, if so she will need a refill  RN asked pt if she had any s/e while on it, she said she 'felt' like her hands were swollen, but they were not, "they just felt like balloons"  RN told pt she will discuss with the NP and c/b

## 2019-03-01 NOTE — TELEPHONE ENCOUNTER
Rec'd voicemail from patient to cx for today  Called and spoke with patient  She is on abx for 5d  Rescheduled:  Bilateral L4, L5 MBB #1 to 3/11  She knows that she must finish full abx course and be without s/s x2d and she stated verbal understanding on this  ++    Patient was started on gabapentin 300 mg one po @[facility]  She has 5 caps left and will need refill  She had appt with Zhao Smith set up for 2/25 after the MBB to determine response but cx this as she wanted to wait until after her 3/6 appt with neurosurgery also  Refill to [facility] on [address] as listed in pharmacy record  She states that it took 2 weeks for her to feel any difference to her leg pain but she does feel that it is helping       Please call her to review her gabapentin and refill: [phone]

## 2019-03-01 NOTE — TELEPHONE ENCOUNTER
I'm not familiar with this patient's case - I've never seen them before  If she wants to re initiate she can  That's up to her  If she found it effective, I don't think it'd be a bad idea

## 2019-03-01 NOTE — TELEPHONE ENCOUNTER
S/w pt, she would like to go back on the Gabapentin, she would like a refill sent to the pharmacy on file  Wants to continue with the 300 mg at HS, and not increase, because that dose was helping

## 2019-03-11 ENCOUNTER — HOSPITAL ENCOUNTER (OUTPATIENT)
Dept: RADIOLOGY | Facility: MEDICAL CENTER | Age: 34
Discharge: HOME | End: 2019-03-11
Admitting: PHYSICAL MEDICINE & REHABILITATION
Payer: COMMERCIAL

## 2019-03-11 VITALS
HEART RATE: 88 BPM | RESPIRATION RATE: 20 BRPM | OXYGEN SATURATION: 100 % | DIASTOLIC BLOOD PRESSURE: 81 MMHG | TEMPERATURE: 97.9 F | SYSTOLIC BLOOD PRESSURE: 114 MMHG

## 2019-03-11 DIAGNOSIS — M43.17 SPONDYLOLISTHESIS AT L5-S1 LEVEL: ICD-10-CM

## 2019-03-11 DIAGNOSIS — M54.41 CHRONIC RIGHT-SIDED LOW BACK PAIN WITH RIGHT-SIDED SCIATICA: ICD-10-CM

## 2019-03-11 DIAGNOSIS — G89.29 CHRONIC RIGHT-SIDED LOW BACK PAIN WITH RIGHT-SIDED SCIATICA: ICD-10-CM

## 2019-03-11 PROCEDURE — 64493 INJ PARAVERT F JNT L/S 1 LEV: CPT | Performed by: PHYSICAL MEDICINE & REHABILITATION

## 2019-03-11 RX ADMIN — Medication 2 ML: at 08:40

## 2019-03-11 NOTE — H&P
History of Present Illness:  The patient is a 29 y o  female who presents with complaints of bilateral lower back pain    Patient Active Problem List   Diagnosis    Class 1 obesity    Vitamin D deficiency    Chronic migraine without aura without status migrainosus, not intractable    Vitamin B12 deficiency    Sacroiliitis (HCC)    Spondylolisthesis at L5-S1 level    Chronic right-sided low back pain with right-sided sciatica       Past Medical History:   Diagnosis Date    Abdominal pain     Anxiety     Asthma     Atypical face pain     Chronic constipation     Chronic midline low back pain     Diffuse pain     Fatigue     Headache, cervicogenic     Irritable bowel syndrome (IBS)     Low vitamin D level     Menstrual migraine     Myofacial muscle pain     Neck pain     NUD (nonulcer dyspepsia)     Obesity     Sore throat     Vertigo, benign paroxysmal     Viral syndrome     Vitamin B12 deficiency     Wrist pain, unspecified laterality        Past Surgical History:   Procedure Laterality Date    BACK SURGERY      ESOPHAGOGASTRODUODENOSCOPY      KNEE SURGERY      SHOULDER SURGERY           Current Outpatient Medications:     albuterol (PROVENTIL HFA,VENTOLIN HFA) 90 mcg/act inhaler, Inhale 2 puffs every 6 (six) hours as needed for wheezing, Disp: 1 Inhaler, Rfl: 0    aspirin-acetaminophen-caffeine (EXCEDRIN MIGRAINE) 250-250-65 MG per tablet, Take 2 tablets by mouth daily, Disp: , Rfl:     cyclobenzaprine (FLEXERIL) 10 mg tablet, Take 1 tablet (10 mg total) by mouth daily at bedtime as needed for muscle spasms (or headache), Disp: 30 tablet, Rfl: 0    ergocalciferol (VITAMIN D2) 50,000 units, TAKE ONE CAPSULE BY MOUTH WEEKLY , Disp: 4 capsule, Rfl: 0    gabapentin (NEURONTIN) 300 mg capsule, Take 1 capsule (300 mg total) by mouth daily at bedtime, Disp: 30 capsule, Rfl: 0    ibuprofen (MOTRIN) 400 mg tablet, Take 400 mg by mouth every 6 (six) hours as needed for mild pain, Disp: , Rfl:     levonorgestrel (MIRENA, 52 MG,) 20 MCG/24HR IUD, 1 each by Intrauterine route once, Disp: , Rfl:     MICROGESTIN FE 1/20 1-20 MG-MCG per tablet, , Disp: , Rfl:     Multiple Vitamin (MULTI-DAY) TABS, Take 1 tablet by mouth daily, Disp: , Rfl:     omeprazole (PriLOSEC) 20 mg delayed release capsule, TAKE 1 CAPSULE BY MOUTH DAILY  , Disp: 30 capsule, Rfl: 1    traMADol (ULTRAM) 50 mg tablet, , Disp: , Rfl:     Current Facility-Administered Medications:     lidocaine (PF) (XYLOCAINE-MPF) 2 % injection 4 mL, 4 mL, Perineural, Once, Jostin Hobbs, DO    Allergies   Allergen Reactions    Gadolinium Hives     Patient had redness over chest and arms minimally itchy  Patient refused to go the ED redness started to resolve after several minutes  LOT #[id number] gadavist       Physical Exam:   Vitals:    03/11/19 0830   BP: 120/87   Pulse: 86   Resp: 18   Temp: 97 9 °F (36 6 °C)   SpO2: 100%     General: Awake, Alert, Oriented x 3, Mood and affect appropriate  Respiratory: Respirations even and unlabored  Cardiovascular: Peripheral pulses intact; no edema  Musculoskeletal Exam:  Bilateral lower back pain    ASA Score:  2  Patient/Chart Verification  Patient ID Verified: Verbal  ID Band Applied: Yes  Consents Confirmed: Procedural  H&P( within 30 days) Verified: To be obtained in the Pre-Procedure area  Interval H&P(within 24 hr) Complete (required for Outpatients and Surgery Admit only): To be obtained in the Pre-Procedure area  Allergies Reviewed: Yes  Anticoag/NSAID held?: No  Currently on antibiotics?: No  Pregnancy denied?: No    Assessment:   1  Spondylolisthesis at L5-S1 level    2   Chronic right-sided low back pain with right-sided sciatica        Plan: AL L4, L5 MBB #1

## 2019-03-11 NOTE — DISCHARGE INSTRUCTIONS

## 2019-03-12 ENCOUNTER — TELEPHONE (OUTPATIENT)
Dept: RADIOLOGY | Facility: MEDICAL CENTER | Age: 34
End: 2019-03-12

## 2019-03-12 NOTE — TELEPHONE ENCOUNTER
Pain diary reviewed by Dr Chad Bravo; schedule f/u with NP       Appt line: review lumbar MBB response/options

## 2019-03-20 ENCOUNTER — OFFICE VISIT (OUTPATIENT)
Dept: NEUROSURGERY | Facility: CLINIC | Age: 34
End: 2019-03-20
Payer: COMMERCIAL

## 2019-03-20 VITALS
HEIGHT: 64 IN | HEART RATE: 84 BPM | BODY MASS INDEX: 33.8 KG/M2 | RESPIRATION RATE: 16 BRPM | SYSTOLIC BLOOD PRESSURE: 131 MMHG | WEIGHT: 198 LBS | DIASTOLIC BLOOD PRESSURE: 82 MMHG

## 2019-03-20 DIAGNOSIS — Z98.1 ARTHRODESIS STATUS: ICD-10-CM

## 2019-03-20 DIAGNOSIS — M54.40 BILATERAL LOW BACK PAIN WITH SCIATICA, SCIATICA LATERALITY UNSPECIFIED, UNSPECIFIED CHRONICITY: Primary | ICD-10-CM

## 2019-03-20 PROCEDURE — 99214 OFFICE O/P EST MOD 30 MIN: CPT | Performed by: NEUROLOGICAL SURGERY

## 2019-03-20 NOTE — PROGRESS NOTES
Assessment/Plan:    No problem-specific Assessment & Plan notes found for this encounter  Diagnoses and all orders for this visit:    Bilateral low back pain with sciatica, sciatica laterality unspecified, unspecified chronicity  -     Ambulatory referral to Orthopedic Surgery; Future    Arthrodesis status  -     Ambulatory referral to Orthopedic Surgery; Future        Subjective:      Patient ID: Michael Carey is a 29 y o  female  HPI    The following portions of the patient's history were reviewed and updated as appropriate: allergies, current medications, past family history, past medical history, past social history, past surgical history and problem list     Review of Systems   Constitutional: Negative for chills, fatigue and fever  HENT: Positive for congestion  Eyes: Negative for pain and visual disturbance  Respiratory: Negative for cough, shortness of breath and wheezing  Cardiovascular: Negative for chest pain and palpitations  Gastrointestinal: Negative for abdominal pain and nausea  Genitourinary: Negative for difficulty urinating  Musculoskeletal: Positive for arthralgias and back pain  Negative for gait problem, neck pain and neck stiffness  Neurological: Positive for numbness  Negative for dizziness, speech difficulty, weakness and headaches  Objective:      /82 (BP Location: Left arm, Patient Position: Sitting, Cuff Size: Standard)   Pulse 84   Resp 16   Ht 5' 4" (1 626 m)   Wt 89 8 kg (198 lb)   BMI 33 99 kg/m²          Physical Exam      HPI    Patient returns to review xray and fu PT/CHRISTI  Reports benefit from gabapentin for leg pain, modest relief with PT/CHRISTI  Ongoing LBP exacerbated by mov't, flex/ext  Previous L5/S1 TP/SP wiring in adolescence  Denies bowel or bladder symptoms      Exam    Stable    Radiology    Xray: no gross mov't evidence of instability at L5/S1 level with flex/ext, minimal grade 1 spondylolisthesis    CT: L5/S1 TP/SP wiring with minimally displaced fractured wire, chronic bilateral L5 pars fractures    MRI: modest L5/S1 disc degeneration with widely patent central canal, no foraminal encroachment    Summary and Plan    Ms Ethel Sandoval is stable with respect to her back and leg pain  Today we reviewed the results of her CT and Xray  I explained to her that there is no evidence of gross instability  We reviewed the role of conservative treatment including PT, CHRISTI and medications  I explained to her that surgical re-exploration with the aim of revision decompression/instrumentation would offer her no guarantee of improvement with associated complications and risk of long term deterioration  I have referred her to Dr Lorenza Ye for a second opinion  I will see her on a PRN basis

## 2019-03-25 DIAGNOSIS — E55.9 VITAMIN D DEFICIENCY: ICD-10-CM

## 2019-03-25 RX ORDER — ERGOCALCIFEROL 1.25 MG/1
CAPSULE ORAL
Qty: 4 CAPSULE | Refills: 0 | OUTPATIENT
Start: 2019-03-25

## 2019-04-08 ENCOUNTER — OFFICE VISIT (OUTPATIENT)
Dept: OBGYN CLINIC | Facility: HOSPITAL | Age: 34
End: 2019-04-08
Payer: COMMERCIAL

## 2019-04-08 VITALS
HEART RATE: 88 BPM | WEIGHT: 200 LBS | BODY MASS INDEX: 34.15 KG/M2 | DIASTOLIC BLOOD PRESSURE: 77 MMHG | HEIGHT: 64 IN | SYSTOLIC BLOOD PRESSURE: 124 MMHG

## 2019-04-08 DIAGNOSIS — M54.40 BILATERAL LOW BACK PAIN WITH SCIATICA, SCIATICA LATERALITY UNSPECIFIED, UNSPECIFIED CHRONICITY: ICD-10-CM

## 2019-04-08 DIAGNOSIS — Z98.1 ARTHRODESIS STATUS: ICD-10-CM

## 2019-04-08 DIAGNOSIS — M43.17 SPONDYLOLISTHESIS AT L5-S1 LEVEL: Primary | ICD-10-CM

## 2019-04-08 PROCEDURE — 99204 OFFICE O/P NEW MOD 45 MIN: CPT | Performed by: ORTHOPAEDIC SURGERY

## 2019-04-08 RX ORDER — GABAPENTIN 300 MG/1
300 CAPSULE ORAL
Qty: 30 CAPSULE | Refills: 0 | Status: SHIPPED | OUTPATIENT
Start: 2019-04-08 | End: 2019-07-12 | Stop reason: SDUPTHER

## 2019-04-24 DIAGNOSIS — E55.9 VITAMIN D DEFICIENCY: ICD-10-CM

## 2019-04-29 RX ORDER — ERGOCALCIFEROL 1.25 MG/1
CAPSULE ORAL
Qty: 4 CAPSULE | Refills: 0 | OUTPATIENT
Start: 2019-04-29

## 2019-05-24 DIAGNOSIS — E55.9 VITAMIN D DEFICIENCY: ICD-10-CM

## 2019-05-24 RX ORDER — ERGOCALCIFEROL 1.25 MG/1
CAPSULE ORAL
Qty: 4 CAPSULE | Refills: 0 | Status: SHIPPED | OUTPATIENT
Start: 2019-05-24 | End: 2020-12-24 | Stop reason: ALTCHOICE

## 2019-06-04 NOTE — PROGRESS NOTES
Daily Note     Today's date: 2019  Patient name: Aziza Taveras  : 1985  MRN: [id number]  Referring provider: Marcel Sheets MD  Dx:   Encounter Diagnosis     ICD-10-CM    1  Chronic midline low back pain with bilateral sciatica M54 41     M54 42     G89 29    2  Closed fracture of lumbar vertebra without spinal cord injury with delayed healing S32 009G    3  Spondylolisthesis of lumbar region M43 16                   Subjective: patient states that she is feeling about the same having no changes in her lower back pain  Patient notes that her leg pain is improved with the Gabapentin that she is taking  Patient is concerned that the injection will not have effect if she is feeling better from the medication  Objective: See treatment diary below     Precautions: L5 spondy failed surgery    Daily Treatment Diary     Manual                                                                                   Exercise Diary                                        Stability chest press 15x2 blue band            LTR 30            SKTC             SLR 20            Bridging with ball between knees 20            Clamshell with band 20            Standing hip abduction 20            Standing hip extension 20            Modified plank [id number]                         Bike 5 min                                                                                                           Modalities                           Moist heat                                    Assessment: Tolerated treatment fair  Patient demonstrated fatigue post treatment, exhibited good technique with therapeutic exercises and would benefit from continued PT  Patient was educated on her condition and time was spent discussing the importance of stability program adherence to her situation  Plan: Continue per plan of care 
04-Jun-2019

## 2019-07-12 ENCOUNTER — TELEPHONE (OUTPATIENT)
Dept: NEUROLOGY | Facility: CLINIC | Age: 34
End: 2019-07-12

## 2019-07-12 DIAGNOSIS — M54.40 BILATERAL LOW BACK PAIN WITH SCIATICA, SCIATICA LATERALITY UNSPECIFIED, UNSPECIFIED CHRONICITY: ICD-10-CM

## 2019-07-12 DIAGNOSIS — G43.709 CHRONIC MIGRAINE WITHOUT AURA WITHOUT STATUS MIGRAINOSUS, NOT INTRACTABLE: ICD-10-CM

## 2019-07-15 RX ORDER — CYCLOBENZAPRINE HCL 10 MG
10 TABLET ORAL
Qty: 30 TABLET | Refills: 0 | Status: SHIPPED | OUTPATIENT
Start: 2019-07-15 | End: 2019-08-23 | Stop reason: SDUPTHER

## 2019-07-15 RX ORDER — GABAPENTIN 300 MG/1
300 CAPSULE ORAL
Qty: 30 CAPSULE | Refills: 0 | Status: SHIPPED | OUTPATIENT
Start: 2019-07-15 | End: 2019-10-09 | Stop reason: SDUPTHER

## 2019-07-25 ENCOUNTER — HOSPITAL ENCOUNTER (OUTPATIENT)
Dept: RADIOLOGY | Facility: HOSPITAL | Age: 34
Discharge: HOME | End: 2019-07-25
Attending: ORTHOPAEDIC SURGERY
Payer: COMMERCIAL

## 2019-07-25 ENCOUNTER — OFFICE VISIT (OUTPATIENT)
Dept: OBGYN CLINIC | Facility: HOSPITAL | Age: 34
End: 2019-07-25
Payer: COMMERCIAL

## 2019-07-25 VITALS
SYSTOLIC BLOOD PRESSURE: 120 MMHG | DIASTOLIC BLOOD PRESSURE: 85 MMHG | HEIGHT: 64 IN | HEART RATE: 102 BPM | WEIGHT: 202 LBS | BODY MASS INDEX: 34.49 KG/M2

## 2019-07-25 DIAGNOSIS — M43.17 SPONDYLOLISTHESIS AT L5-S1 LEVEL: Primary | ICD-10-CM

## 2019-07-25 DIAGNOSIS — M43.17 SPONDYLOLISTHESIS AT L5-S1 LEVEL: ICD-10-CM

## 2019-07-25 PROCEDURE — 99213 OFFICE O/P EST LOW 20 MIN: CPT | Performed by: ORTHOPAEDIC SURGERY

## 2019-07-25 PROCEDURE — 72110 X-RAY EXAM L-2 SPINE 4/>VWS: CPT

## 2019-07-25 RX ORDER — GABAPENTIN 300 MG/1
300 CAPSULE ORAL 2 TIMES DAILY
Qty: 60 CAPSULE | Refills: 3 | Status: SHIPPED | OUTPATIENT
Start: 2019-07-25 | End: 2019-10-09 | Stop reason: SDUPTHER

## 2019-07-25 NOTE — PROGRESS NOTES
29 y o female presents for continued evaluation of lumbar back pain with radiculopathy  Patient has an established diagnosis of L5-S1 spondylolisthesis  During her last visit, patient was recommended to continue conservative management  Patient states that she has continued to have lumbar back pain which is 8/10 at its worst and 6/10 at baseline  She states that gabapentin 300 HS has alleviated some of her radiculopathy, but has not completely resolved  Patient states that she has difficulty both with prolonged standing as well as laying flat  She does not describe saundra weakness of the right lower extremity, but she does feel like her right leg lag behind her left  She denies any loss of bowel or bladder dysfunction as well as any constitutional symptoms    She has no other complaints at this time    Review of Systems  Review of systems negative unless otherwise specified in HPI    Past Medical History  Past Medical History:   Diagnosis Date    Abdominal pain     Anxiety     Asthma     Atypical face pain     Chronic constipation     Chronic midline low back pain     Diffuse pain     Fatigue     Headache, cervicogenic     Irritable bowel syndrome (IBS)     Low vitamin D level     Menstrual migraine     Myofacial muscle pain     Neck pain     NUD (nonulcer dyspepsia)     Obesity     Sore throat     Vertigo, benign paroxysmal     Viral syndrome     Vitamin B12 deficiency     Wrist pain, unspecified laterality        Past Surgical History  Past Surgical History:   Procedure Laterality Date    BACK SURGERY      ESOPHAGOGASTRODUODENOSCOPY      KNEE SURGERY      SHOULDER SURGERY         Current Medications  Current Outpatient Medications on File Prior to Visit   Medication Sig Dispense Refill    albuterol (PROVENTIL HFA,VENTOLIN HFA) 90 mcg/act inhaler Inhale 2 puffs every 6 (six) hours as needed for wheezing 1 Inhaler 0    aspirin-acetaminophen-caffeine (EXCEDRIN MIGRAINE) 250-250-65 MG per tablet Take 2 tablets by mouth daily      cyclobenzaprine (FLEXERIL) 10 mg tablet Take 1 tablet (10 mg total) by mouth daily at bedtime as needed for muscle spasms (or headache) 30 tablet 0    ergocalciferol (VITAMIN D2) 50,000 units take 1 capsule by mouth once weekly 4 capsule 0    gabapentin (NEURONTIN) 300 mg capsule Take 1 capsule (300 mg total) by mouth daily at bedtime 30 capsule 0    gabapentin (NEURONTIN) 300 mg capsule Take 1 capsule (300 mg total) by mouth daily at bedtime 30 capsule 0    ibuprofen (MOTRIN) 400 mg tablet Take 400 mg by mouth every 6 (six) hours as needed for mild pain      levonorgestrel (MIRENA, 52 MG,) 20 MCG/24HR IUD 1 each by Intrauterine route once      MICROGESTIN FE 1/20 1-20 MG-MCG per tablet       Multiple Vitamin (MULTI-DAY) TABS Take 1 tablet by mouth daily      omeprazole (PriLOSEC) 20 mg delayed release capsule TAKE 1 CAPSULE BY MOUTH DAILY  30 capsule 1    traMADol (ULTRAM) 50 mg tablet        No current facility-administered medications on file prior to visit  Recent Labs (HCT,HGB,PT,INR,ESR,CRP,GLU,HgA1C)  0   Lab Value Date/Time    HCT 37 1 05/06/2017 1037    HGB 11 8 05/06/2017 1037    WBC 9 3 05/06/2017 1037    ESR 25 (H) 05/06/2017 1037    GLUCOSE 76 09/22/2015 1427         Physical exam  · General: Awake, Alert, Oriented  · Eyes: Pupils equal, round and reactive to light  · Heart: regular rate and rhythm  · Lungs: No audible wheezing  · Abdomen: soft  Back exam  · Mild Tenderness to palpation across his lumbosacral spine  · Well-healed posterior surgical incision  · 5/5 motor L2-S1 bilaterally  · [place] S2-S1 bilaterally  · Negative modified straight leg raise bilaterally  · Negative FRANSISCO and negative FADIR  · Bilateral lower extremities warm well-perfused    Imaging  New AP, lateral, flex/ex views lumbar spine were obtained today and reviewed by myself and Dr Nickolas Najjar   They reveal no interval change of the spondylolisthesis at L5-S1 with similar loss of disc height at L11S1     41-year-old female with grade 1 spondylolisthesis at L5-S1  · Length discussion was had with the patient regarding operative versus non operative management  Given her age and symptom severity, recommend that she continue with conservative management at this time  She has yet to receive an epidural steroid injection to the lumbar spine  We recommend that she obtain an epidural steroid injection given the that she has symptoms into the lower extremities  We also recommend that she increase her gabapentin dose from 300 mg HS to 300 mg b i d   · Patient may follow up on as-needed basis should she continue to experience lumbar back pain

## 2019-07-26 ENCOUNTER — TELEPHONE (OUTPATIENT)
Dept: RADIOLOGY | Facility: MEDICAL CENTER | Age: 34
End: 2019-07-26

## 2019-07-26 NOTE — TELEPHONE ENCOUNTER
Patient referred by Dr Nishant Denis for CHRISTI L5-S1  Dr Lety Laws reviewed imaging; patient to be scheduled for right L5 LESI  Left message for patient to call me back DIRECTLY

## 2019-08-15 DIAGNOSIS — E55.9 VITAMIN D DEFICIENCY: ICD-10-CM

## 2019-08-16 RX ORDER — ERGOCALCIFEROL 1.25 MG/1
CAPSULE ORAL
Qty: 4 CAPSULE | Refills: 0 | OUTPATIENT
Start: 2019-08-16

## 2019-08-23 DIAGNOSIS — G43.709 CHRONIC MIGRAINE WITHOUT AURA WITHOUT STATUS MIGRAINOSUS, NOT INTRACTABLE: ICD-10-CM

## 2019-08-26 RX ORDER — CYCLOBENZAPRINE HCL 10 MG
10 TABLET ORAL
Qty: 30 TABLET | Refills: 0 | Status: SHIPPED | OUTPATIENT
Start: 2019-08-26 | End: 2019-12-31 | Stop reason: SDUPTHER

## 2019-09-19 NOTE — TELEPHONE ENCOUNTER
Left message for patient to call me back DIRECTLY to schedule  Is she still interested in injection?

## 2019-09-21 DIAGNOSIS — E55.9 VITAMIN D DEFICIENCY: ICD-10-CM

## 2019-09-23 RX ORDER — ERGOCALCIFEROL 1.25 MG/1
CAPSULE ORAL
Qty: 4 CAPSULE | Refills: 0 | OUTPATIENT
Start: 2019-09-23

## 2019-10-09 ENCOUNTER — OFFICE VISIT (OUTPATIENT)
Dept: NEUROLOGY | Facility: CLINIC | Age: 34
End: 2019-10-09
Payer: COMMERCIAL

## 2019-10-09 ENCOUNTER — TELEPHONE (OUTPATIENT)
Dept: NEUROLOGY | Facility: CLINIC | Age: 34
End: 2019-10-09

## 2019-10-09 VITALS — HEART RATE: 101 BPM | SYSTOLIC BLOOD PRESSURE: 136 MMHG | DIASTOLIC BLOOD PRESSURE: 77 MMHG

## 2019-10-09 DIAGNOSIS — M54.2 NECK PAIN: ICD-10-CM

## 2019-10-09 DIAGNOSIS — G43.709 CHRONIC MIGRAINE WITHOUT AURA WITHOUT STATUS MIGRAINOSUS, NOT INTRACTABLE: ICD-10-CM

## 2019-10-09 DIAGNOSIS — G89.29 CHRONIC RIGHT-SIDED LOW BACK PAIN WITH RIGHT-SIDED SCIATICA: Primary | ICD-10-CM

## 2019-10-09 DIAGNOSIS — M43.17 SPONDYLOLISTHESIS AT L5-S1 LEVEL: ICD-10-CM

## 2019-10-09 DIAGNOSIS — G43.829 MENSTRUAL MIGRAINE WITHOUT STATUS MIGRAINOSUS, NOT INTRACTABLE: ICD-10-CM

## 2019-10-09 DIAGNOSIS — M54.41 RIGHT-SIDED LOW BACK PAIN WITH RIGHT-SIDED SCIATICA, UNSPECIFIED CHRONICITY: ICD-10-CM

## 2019-10-09 DIAGNOSIS — M79.18 MYOFASCIAL PAIN: ICD-10-CM

## 2019-10-09 DIAGNOSIS — M54.41 CHRONIC RIGHT-SIDED LOW BACK PAIN WITH RIGHT-SIDED SCIATICA: Primary | ICD-10-CM

## 2019-10-09 PROCEDURE — 99215 OFFICE O/P EST HI 40 MIN: CPT | Performed by: PHYSICIAN ASSISTANT

## 2019-10-09 RX ORDER — KETOROLAC TROMETHAMINE 10 MG/1
TABLET, FILM COATED ORAL
Qty: 10 TABLET | Refills: 0 | Status: SHIPPED | OUTPATIENT
Start: 2019-10-09

## 2019-10-09 RX ORDER — BACLOFEN 10 MG/1
TABLET ORAL
Qty: 30 TABLET | Refills: 2 | Status: SHIPPED | OUTPATIENT
Start: 2019-10-09

## 2019-10-09 RX ORDER — AMITRIPTYLINE HYDROCHLORIDE 10 MG/1
TABLET, FILM COATED ORAL
Qty: 60 TABLET | Refills: 2 | Status: SHIPPED | OUTPATIENT
Start: 2019-10-09 | End: 2019-12-31 | Stop reason: SDUPTHER

## 2019-10-09 RX ORDER — GABAPENTIN 300 MG/1
CAPSULE ORAL
Qty: 180 CAPSULE | Refills: 2 | Status: SHIPPED | OUTPATIENT
Start: 2019-10-09 | End: 2020-02-21 | Stop reason: SDUPTHER

## 2019-10-09 RX ORDER — LIDOCAINE 50 MG/G
1 PATCH TOPICAL DAILY
Qty: 30 PATCH | Refills: 0 | Status: SHIPPED | OUTPATIENT
Start: 2019-10-09 | End: 2020-12-24 | Stop reason: ALTCHOICE

## 2019-10-09 NOTE — PROGRESS NOTES
Patient ID: Kwabena Díaz is a 36 y o  female  Assessment/Plan:     Diagnoses and all orders for this visit:    Chronic migraine without aura without status migrainosus, not intractable  -     ketorolac (TORADOL) 10 mg tablet; 1 tab q8 hour prn migraine  Max 2-3 per week  Hold ibuprofen and aleve  Chronic right-sided low back pain with right-sided sciatica  -     lidocaine (LIDODERM) 5 %; Apply 1 patch topically daily Remove & Discard patch within 12 hours or as directed by MD  -     baclofen 10 mg tablet; 1 tab qhs prn back pain  Hold flexeril   -     amitriptyline (ELAVIL) 10 mg tablet; 1 tab qhs x 1 week, then Increase to 20 mg qhs if tolerated  -     EMG 2 limb lower extremity; Future    Spondylolisthesis at L5-S1 level  -     gabapentin (NEURONTIN) 300 mg capsule; 1 cap BID  Right-sided low back pain with right-sided sciatica, unspecified chronicity    Myofascial pain    Neck pain    Menstrual migraine without status migrainosus, not intractable           60-year-old female with migraine headaches, menstrual migraine  At the onset of these she can continue Excedrin migraine, Toradol if that fails  She will try amitriptyline for prevention  She reports worsening right-sided back pain with radicular symptoms and is not a candidate for surgery per Orthopedics and Neurosurgery  CHRISTI is considered but the patient is hesitant due to cost and unsure if it is going to work  Thankfully gabapentin is helping and she was recommended to continue this 300 b i d , although may be causing some memory issues  I would rather her get pain relief  So she should continue gabapentin, add amitriptyline for headaches as above and this may also help with the back pain  Continue Flexeril at night with this  If this combination does not help after a week or 2 she can hold Flexeril, try baclofen  I am also hoping that the amitriptyline addresses depression  If not I have a low threshold to starting an SSRI    She tried and failed SNRIs  S/e of all meds above were reviewed  EMG ordered for LEs  She asked about MM and CBD oil  I think CBD oil would be more appropriate at this time  The patient should not hesitate to call me prior to her follow up with any questions or concerns  The patient was instructed to urgently call 911 or present to the nearest emergency room with any new or worsening neurological deficits  Subjective:    HPI     Ms Prerna Mccormick is a 36year old left handed female  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[employer]; she works on a computer  She is here today for evaluation of her headaches , low back pain and myofascial pain  Continues to have migraine headaches, primarily a stabbing headache in the left parietal region  Recently worse  I last saw her in 2017 and they were improved for short period of time  She used protriptyline but it increased her heart rate so she stopped it  She discontinued verapamil, Maxalt and Imitrex in the past because she felt that they made her nauseous, caused abdominal pain  Menstruation is a big trigger  Has migraine headaches several times per week  Migraines also a/w neck pain/ spasm  Since last seen the patient tells me that her back pain is worsening with right greater than left radicular symptoms  She reports mild weakness bilaterally right greater than left  She reports that the pain wakes her up at night  She thinks something is pinching  She describes muscle aches in all 4 extremities which has been ongoing for several years  When someone touches her it hurts longer than usual which she does not think is normal   She tried Cymbalta since I last saw her but this caused night sweats  She thinks she tried Effexor in the past and had some side effects with this as well  She reports bilateral numbness and tingling in both arms and legs, though numbness focally except a little bit more in the right lower extremity      She had a flare-up of worsening back pain over the summer  She thinks it may be because she stop the gabapentin at this time  She notice an improvement of pain after restarting gabapentin  Patient has seen pain management, Neurosurgery and Orthopedic surgery and surgery is not recommended  She had was recommended to get epidural but is fearful of getting it because of the expense and unsure if it is going to work  When she was 16 yo she had a wire placed at L5 surgically at [facility]  She does not remember the name of the physician  She states he probably retired  - The patient had a snowboarding accident and broke her L5 vertebrae and she was about 16or 14years old (when I was in eighth grade)  She had surgery with wires wrapped around the L5 vertebrae  She continues to have back pain almost every day, and numbness and tingling in her lower extremities right greater than left  She also had shoulder surgery to tighten ligaments in the left shoulder in past     She is having more trouble sleeping lately because of the back pain in headaches  States difficulty falling asleep but wakes up very easily in the middle the night  Denies restless leg syndrome  She takes Flexeril and/or Benadryl which helps with initiating sleep but if she does not take it she will have trouble falling asleep  She has been having some speech changes / word-finding issues lately going on for months  She thinks it may be because she recently increased gabapentin to 300 mg b i d  Per pain management  The gabapentin helps so she does not want to decrease it again  She also reports wrist pain bilaterally, joint pains in the knees, elbows, shoulders  I referred her to Rheumatology in the past which she did not do  EMG in 2017 is negative in the upper extremities  More fatigued lately, probably from no sleep  If she would change 1 thing on questioning it would be her diffuse muscle aches and pains      Prior documentation:  Parietal headaches: She states January of 2017 she started to develop new headaches on the left parietal region  It tends to be very severe when it does occur  At time she puts her head down she feels a lot of pressure in her head  It tends to radiate down to her left jaw at time  Left parietal pain has been going on for the last year  How often do the headaches occur -  What type headaches: 5 per month  pain: that happened just twice with their left parietal pain  What time of the day do the headaches start- varies but always occurs with menstruation  Parietal pain: any time of the day  How long do the headaches last - Migraine: tends to last 6 to 10 hours; Parietal pain: for few hours to a day  Are you ever headache free - yes  Where are they located - migraine headaches: temporal, frontal; Parietal pain as described  What is the intensity of pain - Migraine headaches: Average 2/10; they can get up to 5/10 and left parietal pain 8/10    Any warning prior to headache and how long do they last - none  Describe your usual headache - Migraine headache: Throbbing, Pounding, dull, nagging  Parietal pain is: sharp pain  Associated symptoms: Decrease of appetite, nausea, vomiting with migraine, Photophobia with migraine but not last months and phonophobia with both headaches, Problem with concentration, flushing /pale face, unable to work, prefer to be alone and in a dark room, lightheaded/ dizzy  Any trigger: No trigger for parietal stabbing pains; migraines are caused by menstruation    What medications do you take or have you taken for your headaches?   PREVENTION: Topamax, venlafaxine (mood change), verapamil, Protriptyline  ABORTIVE: Imitrex, maxalt, toradol, ibuprofen, excedrin migraine  Medical/Alternative Treatments used in the past for headaches: chiropractor    The following portions of the patient's history were reviewed and updated as appropriate: She  has a past medical history of Abdominal pain, Anxiety, Asthma, Atypical face pain, Chronic constipation, Chronic midline low back pain, Diffuse pain, Fatigue, Headache, cervicogenic, Irritable bowel syndrome (IBS), Low vitamin D level, Menstrual migraine, Myofacial muscle pain, Neck pain, NUD (nonulcer dyspepsia), Obesity, Sore throat, Vertigo, benign paroxysmal, Viral syndrome, Vitamin B12 deficiency, and Wrist pain, unspecified laterality  She   Patient Active Problem List    Diagnosis Date Noted    Myofascial pain 10/09/2019    Neck pain 10/09/2019    Menstrual migraine without status migrainosus, not intractable 10/09/2019    Right-sided low back pain with right-sided sciatica 04/08/2019    Spondylolisthesis at L5-S1 level 01/28/2019    Chronic right-sided low back pain with right-sided sciatica 01/28/2019    Sacroiliitis ([facility] [address] )     Class 1 obesity 02/06/2018    Vitamin D deficiency 02/06/2018    Chronic migraine without aura without status migrainosus, not intractable 02/06/2018    Vitamin B12 deficiency 03/31/2017     She  has a past surgical history that includes Back surgery; Esophagogastroduodenoscopy; Knee surgery; and Shoulder surgery  Her family history includes Hyperlipidemia in her father and mother  She  reports that she has never smoked  She has never used smokeless tobacco  She reports that she drinks alcohol  She reports that she does not use drugs    Current Outpatient Medications   Medication Sig Dispense Refill    albuterol (PROVENTIL HFA,VENTOLIN HFA) 90 mcg/act inhaler Inhale 2 puffs every 6 (six) hours as needed for wheezing 1 Inhaler 0    aspirin-acetaminophen-caffeine (EXCEDRIN MIGRAINE) 250-250-65 MG per tablet Take 2 tablets by mouth daily      cyclobenzaprine (FLEXERIL) 10 mg tablet Take 1 tablet (10 mg total) by mouth daily at bedtime as needed for muscle spasms (or headache) 30 tablet 0    ergocalciferol (VITAMIN D2) 50,000 units take 1 capsule by mouth once weekly 4 capsule 0    gabapentin (NEURONTIN) 300 mg capsule 1 cap BID  180 capsule 2  ibuprofen (MOTRIN) 400 mg tablet Take 400 mg by mouth every 6 (six) hours as needed for mild pain      levonorgestrel (MIRENA, 52 MG,) 20 MCG/24HR IUD 1 each by Intrauterine route once      Multiple Vitamin (MULTI-DAY) TABS Take 1 tablet by mouth daily      omeprazole (PriLOSEC) 20 mg delayed release capsule TAKE 1 CAPSULE BY MOUTH DAILY  30 capsule 1    amitriptyline (ELAVIL) 10 mg tablet 1 tab qhs x 1 week, then Increase to 20 mg qhs if tolerated  60 tablet 2    baclofen 10 mg tablet 1 tab qhs prn back pain  Hold flexeril  30 tablet 2    ketorolac (TORADOL) 10 mg tablet 1 tab q8 hour prn migraine  Max 2-3 per week  Hold ibuprofen and aleve  10 tablet 0    lidocaine (LIDODERM) 5 % Apply 1 patch topically daily Remove & Discard patch within 12 hours or as directed by MD 30 patch 0    MICROGESTIN FE 1/20 1-20 MG-MCG per tablet       traMADol (ULTRAM) 50 mg tablet        No current facility-administered medications for this visit  She is allergic to gadolinium            Objective:    Blood pressure 136/77, pulse 101  Physical Exam    Neurological Exam  Vital signs reviewed  Well developed, well nourished  Some word-finding issues otherwise speech is fluent and articulate  Mood and affect: Depressed, teary at times  Head: Normocephalic, atraumatic  Neck: Neck flexors 5/5  CN 2-12: intact and symmetric, including EOMs which are normal b/l and PERRL  Fundi b/l are normal to crude ophthalmological examination  MSK: 5/5 t/o  ROM normal x all 4 extr  Reflexes: 2+ and symmetric in all 4 extr  Coordination: Nml x4 extr  Gait: Steady normal gait  ROS:    Review of Systems   Constitutional: Negative  Negative for appetite change and fever  HENT: Negative  Negative for hearing loss, tinnitus, trouble swallowing and voice change  Eyes: Negative  Negative for photophobia and pain  Respiratory: Negative  Negative for shortness of breath  Cardiovascular: Negative    Negative for palpitations  Gastrointestinal: Negative  Negative for nausea and vomiting  Endocrine: Negative  Negative for cold intolerance and heat intolerance  Genitourinary: Negative  Negative for dysuria, frequency and urgency  Musculoskeletal: Positive for back pain, neck pain and neck stiffness  Negative for myalgias  Skin: Negative  Negative for rash  Neurological: Positive for speech difficulty (word finding) and headaches  Negative for dizziness, tremors, seizures, syncope, facial asymmetry, weakness, light-headedness and numbness  Hematological: Negative  Does not bruise/bleed easily  Psychiatric/Behavioral: Negative  Negative for confusion, hallucinations and sleep disturbance  The following portions of the patient's history were reviewed and updated as appropriate: allergies, current medications/ medication history, past family history, past medical history, past social history, past surgical history and problem list     Review of systems was reviewed and otherwise unremarkable from a neurological perspective

## 2019-10-09 NOTE — PATIENT INSTRUCTIONS
[facility]? EMG  Tonight try amitriptyline (depression, nerve pain) + flexeril  Hold baclofen  After a week if no relief, can ry substituting flexeril for baclofen (muscles, spasm, nerve pain)

## 2019-10-09 NOTE — TELEPHONE ENCOUNTER
Fax received from pharmacy  Lidoderm patch requires FERNANDO KING submitted with office notes on CMM  Key: [id number]  Awaiting determination

## 2019-10-16 NOTE — TELEPHONE ENCOUNTER
Called and Left a message on pt's answering machine for a call back    Letter sent to address on file

## 2019-10-21 DIAGNOSIS — E55.9 VITAMIN D DEFICIENCY: ICD-10-CM

## 2019-10-21 RX ORDER — ERGOCALCIFEROL 1.25 MG/1
CAPSULE ORAL
Qty: 4 CAPSULE | Refills: 0 | OUTPATIENT
Start: 2019-10-21

## 2019-11-18 ENCOUNTER — TELEPHONE (OUTPATIENT)
Dept: NEUROLOGY | Facility: CLINIC | Age: 34
End: 2019-11-18

## 2019-11-20 DIAGNOSIS — E55.9 VITAMIN D DEFICIENCY: ICD-10-CM

## 2019-11-20 RX ORDER — ERGOCALCIFEROL 1.25 MG/1
CAPSULE ORAL
Qty: 4 CAPSULE | Refills: 0 | OUTPATIENT
Start: 2019-11-20

## 2019-12-20 DIAGNOSIS — E55.9 VITAMIN D DEFICIENCY: ICD-10-CM

## 2019-12-20 RX ORDER — ERGOCALCIFEROL 1.25 MG/1
CAPSULE ORAL
Qty: 4 CAPSULE | Refills: 0 | OUTPATIENT
Start: 2019-12-20

## 2019-12-31 DIAGNOSIS — G43.709 CHRONIC MIGRAINE WITHOUT AURA WITHOUT STATUS MIGRAINOSUS, NOT INTRACTABLE: ICD-10-CM

## 2019-12-31 DIAGNOSIS — M54.41 CHRONIC RIGHT-SIDED LOW BACK PAIN WITH RIGHT-SIDED SCIATICA: ICD-10-CM

## 2019-12-31 DIAGNOSIS — G89.29 CHRONIC RIGHT-SIDED LOW BACK PAIN WITH RIGHT-SIDED SCIATICA: ICD-10-CM

## 2020-01-02 RX ORDER — CYCLOBENZAPRINE HCL 10 MG
10 TABLET ORAL
Qty: 30 TABLET | Refills: 0 | Status: SHIPPED | OUTPATIENT
Start: 2020-01-02 | End: 2020-02-19 | Stop reason: SDUPTHER

## 2020-01-02 RX ORDER — AMITRIPTYLINE HYDROCHLORIDE 10 MG/1
TABLET, FILM COATED ORAL
Qty: 60 TABLET | Refills: 0 | Status: SHIPPED | OUTPATIENT
Start: 2020-01-02 | End: 2020-02-19 | Stop reason: SDUPTHER

## 2020-02-19 DIAGNOSIS — G89.29 CHRONIC RIGHT-SIDED LOW BACK PAIN WITH RIGHT-SIDED SCIATICA: ICD-10-CM

## 2020-02-19 DIAGNOSIS — G43.709 CHRONIC MIGRAINE WITHOUT AURA WITHOUT STATUS MIGRAINOSUS, NOT INTRACTABLE: ICD-10-CM

## 2020-02-19 DIAGNOSIS — M54.41 CHRONIC RIGHT-SIDED LOW BACK PAIN WITH RIGHT-SIDED SCIATICA: ICD-10-CM

## 2020-02-19 RX ORDER — AMITRIPTYLINE HYDROCHLORIDE 10 MG/1
TABLET, FILM COATED ORAL
Qty: 60 TABLET | Refills: 0 | Status: CANCELLED | OUTPATIENT
Start: 2020-02-19

## 2020-02-21 DIAGNOSIS — M43.17 SPONDYLOLISTHESIS AT L5-S1 LEVEL: ICD-10-CM

## 2020-02-21 RX ORDER — GABAPENTIN 300 MG/1
CAPSULE ORAL
Qty: 180 CAPSULE | Refills: 2 | Status: SHIPPED | OUTPATIENT
Start: 2020-02-21

## 2020-02-21 RX ORDER — AMITRIPTYLINE HYDROCHLORIDE 10 MG/1
TABLET, FILM COATED ORAL
Qty: 60 TABLET | Refills: 0 | Status: SHIPPED | OUTPATIENT
Start: 2020-02-21 | End: 2020-03-17

## 2020-02-21 RX ORDER — CYCLOBENZAPRINE HCL 10 MG
10 TABLET ORAL
Qty: 30 TABLET | Refills: 0 | Status: SHIPPED | OUTPATIENT
Start: 2020-02-21 | End: 2020-04-30 | Stop reason: SDUPTHER

## 2020-03-17 DIAGNOSIS — M54.41 CHRONIC RIGHT-SIDED LOW BACK PAIN WITH RIGHT-SIDED SCIATICA: ICD-10-CM

## 2020-03-17 DIAGNOSIS — G89.29 CHRONIC RIGHT-SIDED LOW BACK PAIN WITH RIGHT-SIDED SCIATICA: ICD-10-CM

## 2020-03-17 RX ORDER — AMITRIPTYLINE HYDROCHLORIDE 10 MG/1
TABLET, FILM COATED ORAL
Qty: 60 TABLET | Refills: 0 | Status: SHIPPED | OUTPATIENT
Start: 2020-03-17 | End: 2020-04-30 | Stop reason: SDUPTHER

## 2020-03-17 NOTE — TELEPHONE ENCOUNTER
Pt calling in  I offered her 3 appointments in June and she is not available for all 3  She does question if she could see PCP to refill Amitriptyline because neurology office visits are more expensive for her than a PCP visit  Please advise        [phone]  Ok to leave a detailed message

## 2020-04-28 DIAGNOSIS — G89.29 CHRONIC RIGHT-SIDED LOW BACK PAIN WITH RIGHT-SIDED SCIATICA: ICD-10-CM

## 2020-04-28 DIAGNOSIS — M54.41 CHRONIC RIGHT-SIDED LOW BACK PAIN WITH RIGHT-SIDED SCIATICA: ICD-10-CM

## 2020-04-28 DIAGNOSIS — G43.709 CHRONIC MIGRAINE WITHOUT AURA WITHOUT STATUS MIGRAINOSUS, NOT INTRACTABLE: ICD-10-CM

## 2020-04-28 RX ORDER — AMITRIPTYLINE HYDROCHLORIDE 10 MG/1
TABLET, FILM COATED ORAL
Qty: 60 TABLET | Refills: 0 | Status: CANCELLED | OUTPATIENT
Start: 2020-04-28

## 2020-04-28 RX ORDER — CYCLOBENZAPRINE HCL 10 MG
10 TABLET ORAL
Qty: 30 TABLET | Refills: 0 | Status: CANCELLED | OUTPATIENT
Start: 2020-04-28

## 2020-04-30 ENCOUNTER — PATIENT MESSAGE (OUTPATIENT)
Dept: NEUROLOGY | Facility: CLINIC | Age: 35
End: 2020-04-30

## 2020-04-30 RX ORDER — AMITRIPTYLINE HYDROCHLORIDE 10 MG/1
TABLET, FILM COATED ORAL
Qty: 60 TABLET | Refills: 5 | Status: SHIPPED | OUTPATIENT
Start: 2020-04-30

## 2020-04-30 RX ORDER — CYCLOBENZAPRINE HCL 10 MG
10 TABLET ORAL
Qty: 30 TABLET | Refills: 2 | Status: SHIPPED | OUTPATIENT
Start: 2020-04-30

## 2020-09-24 ENCOUNTER — TELEPHONE (OUTPATIENT)
Dept: NEUROLOGY | Facility: CLINIC | Age: 35
End: 2020-09-24

## 2020-09-24 NOTE — TELEPHONE ENCOUNTER
RHYS stating that office received her message that she cancelled her appt on 10/1/2020 with [facility] and if she needs us in the future for any issues to contact the office at [phone]  D[employer] message sent as well

## 2020-12-24 ENCOUNTER — OFFICE VISIT (OUTPATIENT)
Dept: BARIATRICS | Facility: CLINIC | Age: 35
End: 2020-12-24
Payer: COMMERCIAL

## 2020-12-24 VITALS
BODY MASS INDEX: 35.22 KG/M2 | SYSTOLIC BLOOD PRESSURE: 132 MMHG | TEMPERATURE: 98.8 F | DIASTOLIC BLOOD PRESSURE: 86 MMHG | HEART RATE: 111 BPM | WEIGHT: 206.3 LBS | HEIGHT: 64 IN

## 2020-12-24 DIAGNOSIS — K21.9 GASTROESOPHAGEAL REFLUX DISEASE WITHOUT ESOPHAGITIS: ICD-10-CM

## 2020-12-24 DIAGNOSIS — J45.909 ASTHMA: ICD-10-CM

## 2020-12-24 DIAGNOSIS — E66.01 SEVERE OBESITY (HCC): Primary | ICD-10-CM

## 2020-12-24 DIAGNOSIS — M43.17 SPONDYLOLISTHESIS AT L5-S1 LEVEL: ICD-10-CM

## 2020-12-24 PROCEDURE — 99214 OFFICE O/P EST MOD 30 MIN: CPT | Performed by: PHYSICIAN ASSISTANT

## 2020-12-28 PROBLEM — K76.0 FATTY LIVER: Status: ACTIVE | Noted: 2020-12-28

## 2020-12-28 PROBLEM — K21.9 GASTROESOPHAGEAL REFLUX DISEASE WITHOUT ESOPHAGITIS: Status: ACTIVE | Noted: 2019-02-11

## 2021-01-13 ENCOUNTER — OFFICE VISIT (OUTPATIENT)
Dept: BARIATRICS | Facility: CLINIC | Age: 36
End: 2021-01-13

## 2021-01-13 VITALS
HEIGHT: 64 IN | WEIGHT: 207 LBS | HEART RATE: 80 BPM | BODY MASS INDEX: 35.34 KG/M2 | SYSTOLIC BLOOD PRESSURE: 116 MMHG | TEMPERATURE: 98.4 F | DIASTOLIC BLOOD PRESSURE: 76 MMHG | RESPIRATION RATE: 15 BRPM

## 2021-01-13 DIAGNOSIS — E66.9 OBESITY, CLASS II, BMI 35-39.9: Primary | ICD-10-CM

## 2021-01-13 PROCEDURE — RECHECK

## 2021-01-13 NOTE — PROGRESS NOTES
Bariatric Nutrition Assessment Note    Type of surgery    Preop  Surgery Date: TBD  Surgeon: Undecided    Nutrition Assessment   Marquis Clemens  30 y o   female     Wt with BMI of 25: 142 lbs  Pre-Op Excess Wt: 65 lbs  Height 5' 3 75" (1 619 m), weight 93 9 kg (207 lb)  Body mass index is 35 81 kg/m²     Gregg St Jeor equation-2100 cals to maintain weight    Weight History   Onset of Obesity: Childhood  Family history of obesity: Yes  Wt Loss Attempts: Exercise  High Protein/Low CHO diets (Atkins, Union, etc )  OTC meds/supplements  Self Created Diets (Portion Control, Healthy Food Choices, etc )  SL MWM  Maximum Wt Lost: 10 lbs    Review of History and Medications   Past Medical History:   Diagnosis Date    Abdominal pain     Anxiety     Arthritis     Asthma     Atypical face pain     Chronic constipation     Chronic midline low back pain     Diffuse pain     Dizziness     Fatigue     GERD (gastroesophageal reflux disease)     Headache, cervicogenic     Increased frequency of urination     Irritable bowel syndrome (IBS)     Low vitamin D level     Menstrual migraine     Myofacial muscle pain     Neck pain     NUD (nonulcer dyspepsia)     Numbness     Obesity     SOB (shortness of breath)     Sore throat     Vertigo, benign paroxysmal     Viral syndrome     Vitamin B12 deficiency     Weakness     Wrist pain, unspecified laterality      Past Surgical History:   Procedure Laterality Date    BACK SURGERY      ESOPHAGOGASTRODUODENOSCOPY      KNEE SURGERY      SHOULDER SURGERY       Social History     Socioeconomic History    Marital status: /Civil Union     Spouse name: Not on file    Number of children: Not on file    Years of education: Not on file    Highest education level: Not on file   Occupational History    Not on file   Social Needs    Financial resource strain: Not on file    Food insecurity     Worry: Not on file     Inability: Not on file   Giacomo Zepeda Transportation needs     Medical: Not on file     Non-medical: Not on file   Tobacco Use    Smoking status: Never Smoker    Smokeless tobacco: Never Used   Substance and Sexual Activity    Alcohol use: Yes     Comment: socially    Drug use: No    Sexual activity: Yes     Birth control/protection: I U D  Lifestyle    Physical activity     Days per week: Not on file     Minutes per session: Not on file    Stress: Not on file   Relationships    Social connections     Talks on phone: Not on file     Gets together: Not on file     Attends Church service: Not on file     Active member of club or organization: Not on file     Attends meetings of clubs or organizations: Not on file     Relationship status: Not on file    Intimate partner violence     Fear of current or ex partner: Not on file     Emotionally abused: Not on file     Physically abused: Not on file     Forced sexual activity: Not on file   Other Topics Concern    Not on file   Social History Narrative    Not on file       Current Outpatient Medications:     albuterol (PROVENTIL HFA,VENTOLIN HFA) 90 mcg/act inhaler, Inhale 2 puffs every 6 (six) hours as needed for wheezing, Disp: 1 Inhaler, Rfl: 0    amitriptyline (ELAVIL) 10 mg tablet, 20 mg at bedtime, Disp: 60 tablet, Rfl: 5    aspirin-acetaminophen-caffeine (EXCEDRIN MIGRAINE) 250-250-65 MG per tablet, Take 2 tablets by mouth daily, Disp: , Rfl:     baclofen 10 mg tablet, 1 tab qhs prn back pain  Hold flexeril , Disp: 30 tablet, Rfl: 2    cyclobenzaprine (FLEXERIL) 10 mg tablet, Take 1 tablet (10 mg total) by mouth daily at bedtime as needed for muscle spasms (or headache), Disp: 30 tablet, Rfl: 2    gabapentin (NEURONTIN) 300 mg capsule, 1 cap BID , Disp: 180 capsule, Rfl: 2    ibuprofen (MOTRIN) 400 mg tablet, Take 400 mg by mouth every 6 (six) hours as needed for mild pain, Disp: , Rfl:     ketorolac (TORADOL) 10 mg tablet, 1 tab q8 hour prn migraine  Max 2-3 per week   Hold ibuprofen and aleve  (Patient not taking: Reported on 12/24/2020), Disp: 10 tablet, Rfl: 0    levonorgestrel (MIRENA, 52 MG,) 20 MCG/24HR IUD, 1 each by Intrauterine route once, Disp: , Rfl:     Multiple Vitamin (MULTI-DAY) TABS, Take 1 tablet by mouth daily, Disp: , Rfl:     omeprazole (PriLOSEC) 20 mg delayed release capsule, TAKE 1 CAPSULE BY MOUTH DAILY  , Disp: 30 capsule, Rfl: 1  Food Intake and Lifestyle Assessment   Food Intake Assessment completed via usual diet recall  Breakfast: Belvita or bagel with cream cheese or butter  Snack: 0   Lunch: 0  Snack: 0  Dinner: frozen meal or grilled chicken with rice, vegetables or pasta   Snack: 0  Beverage intake: water and iced coffee  Protein supplement:  When on MWM program  Estimated protein intake per day: 45-50 gm  Estimated fluid intake per day: 60 oz  Meals eaten away from home: 2-3 times/week  Typical meal pattern: 2 meals per day and 0 snacks per day  Eating Behaviors: skips meals  Food allergies or intolerances: Allergies   Allergen Reactions    Nuts      Stomach upset    Gadolinium Hives     Patient had redness over chest and arms minimally itchy  Patient refused to go the ED redness started to resolve after several minutes  LOT #[id number] [facility]     Cultural or Episcopalian considerations: none    Physical Assessment  Physical Activity  Types of exercise: None  Current physical limitations: back issues    Psychosocial Assessment   Support systems: spouse-had weight loss surgery years ago  Socioeconomic factors: works full time    Nutrition Diagnosis  Diagnosis: Overweight / Obesity (NC-3 3)  Related to: Physical inactivity and Excessive energy intake  As Evidenced by: BMI >25     Nutrition Prescription: Recommend the following diet  Low fat, Low sugar, High protein and Regular    Interventions and Teaching   Discussed pre-op and post-op nutrition guidelines  Patient educated and handouts provided    Surgical changes to stomach / GI  Capacity of post-surgery stomach  Diet progression  Adequate hydration  Sugar and fat restriction to decrease "dumping syndrome"  Fat restriction to decrease steatorrhea  Expected weight loss  Weight loss plateaus/ possibility of weight regain  Exercise  Suggestions for pre-op diet  Nutrition considerations after surgery  Protein supplements  Meal planning and preparation  Appropriate carbohydrate, protein, and fat intake, and food/fluid choices to maximize safe weight loss, nutrient intake, and tolerance   Dietary and lifestyle changes  Possible problems with poor eating habits  Intuitive eating  Techniques for self monitoring and keeping daily food journal  Potential for food intolerance after surgery, and ways to deal with them including: lactose intolerance, nausea, reflux, vomiting, diarrhea, food intolerance, appetite changes, gas  Vitamin / Mineral supplementation of Multivitamin with minerals and Vitamin D  Post-operative pregnancy guidelines    Education provided to: patient    Barriers to learning: No barriers identified  Readiness to change: preparation    Prior research on procedure: discussed with provider    Comprehension: verbalizes understanding     Expected Compliance: good  Recommendations  Pt is an appropriate candidate for surgery   Yes  Evaluation / Monitoring  Dietitian to Monitor: Eating pattern as discussed Body weight Lab values Physical activity    Goals  Food journal, Exercise 30 minutes 5 times per week, Complete lession plans 1-6 and Eat 3 meals per day    Time Spent:   1 Hour

## 2021-01-14 NOTE — PROGRESS NOTES
Bariatric Behavioral Health Evaluation    Presenting Problem patient here to improve health, increase mobility, reduce chronic pain and prevent family disease  Is the patient seeking Bariatric Surgery Eval? Yes  If yes how long have you researched this surgery option   had surgery with our program 8 years ago  Realizes Post- Op Requirements? Yes     Pre-morbid level of function and history of present illness: patient has medical issues  Psychiatric/Psychological Treatment Diagnosis: patient reports Axis I diagnosis of Anxiety and Depression  Patient had post partum depression  States her anxiety is worse than her depression  Patient is not prescribed medication at this time  Patient encouraged to discuss with her PCP and see what medication options may be available to her  Outpatient Counselor No     Psychiatrist No     Have you had Inpatient Treatment? No    Family Constellation (include relationship with each and Psych/Med HX)    Father  obesity    Domestic Violence No    Additional comments/stressors related to family/relationships/peer support     Physical/Psychological Assessment:     Appearance: appropriate  Sociability: friendly  Affect: appropriate  Mood: calm  Thought Process: coherent  Speech: normal  Content: no impairment  Orientation: person  Yes , place  Yes , time  Yes , normal attention span  Yes , normal memory  Yes   and normal judgement  Yes   Insight: emotional  fair    Risk Assessment:     none    Recommendations: Recommended for surgery  yes    Risk of Harm to Self or Others: none reported  Observation:     Interviews this interview only  Access to weapons yes     Weapons secured by gun case  Based on the previous information, the client presents the following risk of harm to self or others: low     Note Patient reports Axis I diagnosis of Anxiety and Depression  Patient is not prescribed any medication at this time   Patient encouraged to discuss with her PCP since she feels her anxiety is getting worse  Patient educated regarding the impact of nicotine and alcohol on the post surgery bariatric patient  Patient has a positive family history for obesity  Patient meets criteria for surgery at this program and is referred to the physician  BARIATRIC SURGERY EDUCATION CHECKLIST    I have received education related to my bariatric surgery process and understand:    Patients may be required to complete a psychiatric evaluation and receive clearance for surgery from their psychiatrist     Patients who undergo weight loss surgery are at higher risk of increased mental health concerns and suicide attempts  Patients may be required to complete a full substance abuse evaluation and then complete all treatment recommendations prior to surgery  If diagnosis of abuse/dependence results, patient may be required to remain sober for one (1) year before having bariatric surgery  Patients on psychiatric medications should check with their provider to discuss psychiatric medications and the changes in absorption  Patient should discuss all time release medications with provider and take all medications as prescribed  The recommendation is that there is no use of  any tobacco products, Hookah or  vapes for the bariatric post-operation patient  Bariatric surgery patients should not consume alcohol as a post-operative patient as it may increase risk of numerous health conditions including but not limited to alcohol abuse and ulcers  There is a possibility of weight regain if patient does not follow all program guidelines and recommendations  Bariatric surgery patients should exercise thirty (30) to sixty (60) minutes per day to maintain post-surgical weight loss  Research indicates that bariatric patients are more successful when they see a therapist for up to two (2) years post-op      Patients will follow all medical and dietary recommendations provided  Patient will keep all scheduled appointments and follow up with their physician for a minimum of five (5) years  Patient will take all vitamins as recommended  Post-operative vitamins are life-long  Patient reviewed Bariatric Surgery Education Checklist and agrees they have received education on these issues

## 2023-04-11 PROBLEM — E66.9 OBESITY, CLASS I, BMI 30-34.9: Status: ACTIVE | Noted: 2023-04-11

## 2023-04-24 ENCOUNTER — OFFICE VISIT (OUTPATIENT)
Dept: URGENT CARE | Age: 38
End: 2023-04-24
Payer: COMMERCIAL

## 2023-04-24 DIAGNOSIS — E66.9 OBESITY, CLASS I, BMI 30-34.9: ICD-10-CM

## 2023-04-24 LAB
ATRIAL RATE: 65 BPM
ATRIAL RATE: 65 BPM
P AXIS: 47 DEGREES
P AXIS: 47 DEGREES
PR INTERVAL: 132 MS
PR INTERVAL: 132 MS
QRS AXIS: 46 DEGREES
QRS AXIS: 46 DEGREES
QRSD INTERVAL: 90 MS
QRSD INTERVAL: 90 MS
QT INTERVAL: 382 MS
QT INTERVAL: 382 MS
QTC INTERVAL: 397 MS
QTC INTERVAL: 397 MS
T WAVE AXIS: 35 DEGREES
T WAVE AXIS: 35 DEGREES
VENTRICULAR RATE: 65 BPM
VENTRICULAR RATE: 65 BPM

## 2023-04-24 PROCEDURE — 93010 ELECTROCARDIOGRAM REPORT: CPT

## 2023-04-24 PROCEDURE — 93005 ELECTROCARDIOGRAM TRACING: CPT | Performed by: EMERGENCY MEDICINE

## 2023-04-26 ENCOUNTER — TELEPHONE (OUTPATIENT)
Dept: BARIATRICS | Facility: CLINIC | Age: 38
End: 2023-04-26

## 2023-04-26 NOTE — TELEPHONE ENCOUNTER
----- Message from Malick Warner, [address] Ybarra sent at 4/25/2023  5:38 PM EDT -----  Please let the patient know I have reviewed her fasting insulin, A1C, and TSH and they were all within acceptable range

## 2023-05-25 ENCOUNTER — CLINICAL SUPPORT (OUTPATIENT)
Dept: BARIATRICS | Facility: CLINIC | Age: 38
End: 2023-05-25

## 2023-05-25 VITALS
BODY MASS INDEX: 34.73 KG/M2 | DIASTOLIC BLOOD PRESSURE: 80 MMHG | RESPIRATION RATE: 16 BRPM | WEIGHT: 196 LBS | SYSTOLIC BLOOD PRESSURE: 125 MMHG | HEIGHT: 63 IN | HEART RATE: 82 BPM

## 2023-05-25 DIAGNOSIS — E66.9 OBESITY, CLASS I, BMI 30-34.9: ICD-10-CM

## 2023-05-25 DIAGNOSIS — R63.5 ABNORMAL WEIGHT GAIN: Primary | ICD-10-CM

## 2023-05-25 RX ORDER — PREDNISONE 20 MG/1
20 TABLET ORAL 2 TIMES DAILY
COMMUNITY
Start: 2023-04-20

## 2023-05-25 RX ORDER — PHENTERMINE HYDROCHLORIDE 15 MG/1
15 CAPSULE ORAL EVERY MORNING
Qty: 30 CAPSULE | Refills: 1 | Status: CANCELLED | OUTPATIENT
Start: 2023-05-25

## 2023-05-25 RX ORDER — FLUTICASONE PROPIONATE 50 MCG
2 SPRAY, SUSPENSION (ML) NASAL DAILY
COMMUNITY
Start: 2023-04-20

## 2023-05-25 RX ORDER — PHENTERMINE HYDROCHLORIDE 30 MG/1
30 CAPSULE ORAL EVERY MORNING
Qty: 30 CAPSULE | Refills: 1 | Status: SHIPPED | OUTPATIENT
Start: 2023-05-25

## 2023-05-25 NOTE — PROGRESS NOTES
- Is BP less than 100/60? NO  - Is BP greater than 140/90? No  - Is HR greater than 100? No  **If yes to any of the above, have patient relax and repeat in 5-10 minutes**    Repeat values:    - Is BP less than 100/60?  - Is BP greater than 140/90?  - Is HR greater than 100?   **If values remain outside of ranges above, please consult provider for next steps**

## 2023-05-25 NOTE — TELEPHONE ENCOUNTER
HR and BP reviewed and within acceptable range  PDMP queried, no red flags  Script for higher dose of phentermine 30 mg daily submitted to the pharmacy

## 2023-07-18 ENCOUNTER — OFFICE VISIT (OUTPATIENT)
Dept: BARIATRICS | Facility: CLINIC | Age: 38
End: 2023-07-18
Payer: COMMERCIAL

## 2023-07-18 VITALS
RESPIRATION RATE: 16 BRPM | DIASTOLIC BLOOD PRESSURE: 83 MMHG | SYSTOLIC BLOOD PRESSURE: 120 MMHG | HEIGHT: 63 IN | HEART RATE: 91 BPM | WEIGHT: 196 LBS | BODY MASS INDEX: 34.73 KG/M2

## 2023-07-18 DIAGNOSIS — E66.9 OBESITY, CLASS I, BMI 30-34.9: Primary | ICD-10-CM

## 2023-07-18 PROCEDURE — 99214 OFFICE O/P EST MOD 30 MIN: CPT | Performed by: NURSE PRACTITIONER

## 2023-07-18 RX ORDER — PHENTERMINE HYDROCHLORIDE 37.5 MG/1
37.5 TABLET ORAL DAILY
Qty: 30 TABLET | Refills: 1 | Status: SHIPPED | OUTPATIENT
Start: 2023-07-18

## 2023-07-18 RX ORDER — OMEPRAZOLE 20 MG/1
20 CAPSULE, DELAYED RELEASE ORAL DAILY PRN
COMMUNITY

## 2023-07-18 RX ORDER — LORATADINE 10 MG/1
10 TABLET ORAL DAILY
COMMUNITY

## 2023-07-18 NOTE — ASSESSMENT & PLAN NOTE
- Patient is pursuing Conservative Program   -Previously started the process for weight loss surgery, but did not qualify.  -Was enrolled in [facility] in 2018.  -Has IUD. - Initial weight loss goal of 5-10% weight loss for improved health  - Patient denies personal history of pancreatitis. Patient also denies personal and family history of thyroid cancer and multiple endocrine neoplasia type 2 (MEN 2 tumor). - Denies history of kidney stones, seizures, or glaucoma. - Does not have coverage for FDA approved weight loss medications. - EKG 4/24/2023: NSR, nonspecific T wave abnormality.   - Phentermine started the end of April 2023 at weight of 198 lbs. Tolerating well. Some difference in appetite, but is interested in increasing the dose further to help with more weight loss. - Increase phentermine to 37.5 mg daily.  - PDMP queried, no red flags.  - Potential side effects of Phentermine: increased heart rate, increased blood pressure, palpitations, headache, dizziness, insomnia, altered mood, abdominal upset, and dry mouth. Notify the provider with change in mood. Please go to the emergency room if you develop thoughts of harming yourself or others. Phentermine should be stopped 2 weeks prior to surgery. Notify the provider with any changes in vision.   - While on phentermine check your resting blood pressure and pulse at least 3 times per week. For example you may do this Monday morning, Wednesday afternoon, and Friday night. Your blood pressure should not be 140/90 or higher and goal pulse goal  bpm (beats per minute.) Notify the provider if either are consistently elevated. - Labs reviewed: CMP, lipid panel, CBC, and TSH 2/23/2023. Initial MWM restart: 198 lbs BMI 34.20  Current: 196 lbs BMI 34.72  Change: -2 lbs       Goals:  Do not skip meals. Try to eat 3 meals per day with healthy snacks in between. Can use a protein shake instead of skipping.    Food log (ie.) www.myfitnesspal.com,sunne.wspeople. com,loseit.com,Anago. com,etc. baritastic (use [employer], dietdoctor. com or smartphone kamlesh [employer] for recipes)  No sugary beverages. At least 64oz of water daily. Increase physical activity by 10 minutes daily. Gradually increase physical activity to a goal of 5 days per week for 30 minutes of MODERATE intensity PLUS 2 days per week of FULL BODY resistance training (use smartphone apps Babble, Home Workout, etc.)  Start food logging, weighing, and measuring food. 1100 fatimah/day  Keep up the great work with water intake, at least 64 ounces daily. Increase walking as tolerated to goal of 5 days per week for 30 minutes cardiovascular exercise and 2 days resistance training. Increase phentermine.

## 2023-07-18 NOTE — PROGRESS NOTES
Assessment/Plan:     Obesity, Class I, BMI 30-34.9  - Patient is pursuing Conservative Program   -Previously started the process for weight loss surgery, but did not qualify.  -Was enrolled in [facility] in 2018.  -Has IUD. - Initial weight loss goal of 5-10% weight loss for improved health  - Patient denies personal history of pancreatitis. Patient also denies personal and family history of thyroid cancer and multiple endocrine neoplasia type 2 (MEN 2 tumor). - Denies history of kidney stones, seizures, or glaucoma. - Does not have coverage for FDA approved weight loss medications. - EKG 4/24/2023: NSR, nonspecific T wave abnormality.   - Phentermine started the end of April 2023 at weight of 198 lbs. Tolerating well. Some difference in appetite, but is interested in increasing the dose further to help with more weight loss. - Increase phentermine to 37.5 mg daily.  - PDMP queried, no red flags.  - Potential side effects of Phentermine: increased heart rate, increased blood pressure, palpitations, headache, dizziness, insomnia, altered mood, abdominal upset, and dry mouth. Notify the provider with change in mood. Please go to the emergency room if you develop thoughts of harming yourself or others. Phentermine should be stopped 2 weeks prior to surgery. Notify the provider with any changes in vision.   - While on phentermine check your resting blood pressure and pulse at least 3 times per week. For example you may do this Monday morning, Wednesday afternoon, and Friday night. Your blood pressure should not be 140/90 or higher and goal pulse goal  bpm (beats per minute.) Notify the provider if either are consistently elevated. - Labs reviewed: CMP, lipid panel, CBC, and TSH 2/23/2023. Initial MWM restart: 198 lbs BMI 34.20  Current: 196 lbs BMI 34.72  Change: -2 lbs       Goals:  Do not skip meals. Try to eat 3 meals per day with healthy snacks in between. Can use a protein shake instead of skipping. Food log (ie.) www.CrowdFlowernesspal.com,SportsBlogs. [employer],loseit.com,[employer]. com,etc. baritastic (use [employer], dietdoctor. com or smartphone kamlesh [employer] for recipes)  No sugary beverages. At least 64oz of water daily. Increase physical activity by 10 minutes daily. Gradually increase physical activity to a goal of 5 days per week for 30 minutes of MODERATE intensity PLUS 2 days per week of FULL BODY resistance training (use smartphone apps avocadostore, Home Workout, etc.)  Start food logging, weighing, and measuring food. 1100 fatimah/day  Keep up the great work with water intake, at least 64 ounces daily. Increase walking as tolerated to goal of 5 days per week for 30 minutes cardiovascular exercise and 2 days resistance training. Increase phentermine. Ankur Lizama was seen today for follow-up. Diagnoses and all orders for this visit:    Obesity, Class I, BMI 30-34.9  -     phentermine (ADIPEX-P) 37.5 MG tablet; Take 1 tablet (37.5 mg total) by mouth in the morning            Follow up in approximately 2 month nurse visit and 4 months with Non-Surgical Physician/Advanced Practitioner. Subjective:   Chief Complaint   Patient presents with   • Follow-up     MWM 2/3m f/u; Waist-36.5in       Patient ID: Ronni Park  is a 40 y.o. female with excess weight/obesity here to pursue weight management. Patient is pursuing Conservative Program. Most recent notes and records were reviewed. HPI    Wt Readings from Last 10 Encounters:   07/18/23 88.9 kg (196 lb)   05/25/23 88.9 kg (196 lb)   04/11/23 89.8 kg (198 lb)   01/13/21 93.9 kg (207 lb)   12/24/20 93.6 kg (206 lb 4.8 oz)   07/25/19 91.6 kg (202 lb)   04/08/19 90.7 kg (200 lb)   03/20/19 89.8 kg (198 lb)   01/28/19 89.3 kg (196 lb 12.8 oz)   01/23/19 87.1 kg (192 lb)     Previously was enrolled in [facility] in 2018. Was interested in weight loss surgery in 2020, but did not qualify. Taking Phentermine 30 mg daily. No negative side effects.  Some improvement with appetite. Was recently on vacation. Not food logging, but eats similarly. B- skips  S- none   L- avocado toast 2 slices  S- none or pop corners   D- baked chicken and veggies and rice    S- none     Hydration- 72 oz water, 1 cup coffee - black, occ coke zero   Alcohol- on holidays   Tobacco- denies  Exercise- walking 1 mile two days per week - exercise currently limited due to back pain. Occupation- logistics - sedentary job  Sleep- 5 hours    Colonoscopy: N/A  Mammogram: UTD, due age 37      The following portions of the patient's history were reviewed and updated as appropriate: allergies, current medications, past family history, past medical history, past social history, past surgical history, and problem list.    Family History   Problem Relation Age of Onset   • Hyperlipidemia Mother    • Hyperlipidemia Father    • Obesity Father    • Hypertension Father    • Diabetes Maternal Grandmother    • Heart disease Maternal Grandmother    • Stroke Maternal Grandmother    • Diabetes Maternal Grandfather    • Heart disease Maternal Grandfather    • Stroke Paternal Grandmother    • Dementia Paternal Grandfather    • Cancer Neg Hx         Review of Systems   HENT: Negative for sore throat. Respiratory: Negative for cough and shortness of breath. Cardiovascular: Negative for chest pain and palpitations. Gastrointestinal: Negative for abdominal pain, constipation, diarrhea, nausea and vomiting.        + GERD diet controlled   Musculoskeletal: Positive for arthralgias (chronic) and back pain (chronic). Skin: Negative for rash. Psychiatric/Behavioral: Negative for suicidal ideas (or HI). Denies depression   + anxiety situational        Objective:  /83   Pulse 91   Resp 16   Ht 5' 3" (1.6 m)   Wt 88.9 kg (196 lb)   BMI 34.72 kg/m²     Physical Exam  Vitals and nursing note reviewed. Constitutional   General appearance: Abnormal.  well developed and obese. Eyes No conjunctival injection. Ears, Nose, Mouth, and Throat Oral mucosa moist.   Pulmonary   Respiratory effort: No increased work of breathing or signs of respiratory distress. Cardiovascular     Examination of extremities for edema and/or varicosities: Normal.  no edema. Abdomen   Abdomen: Abnormal.  The abdomen was obese.     Musculoskeletal   Normal range of motion  Neurological   Gait and station: Normal.    Psychiatric   Orientation to person, place and time: Normal.    Affect: appropriate

## 2024-05-05 NOTE — PROGRESS NOTES
Initial RD session for VLCD completed  Components of diet discussed including ketosis, hydration, possible side effects  2 weeks of product ordered and received  Will f/u via e-mail in 2-3 days 
Eden Matthews  Obstetrics and Gynecology  [address] [place] [address]  Phone: [phone]  Fax: [phone]  Follow Up Time: 1 week